# Patient Record
Sex: FEMALE | Race: WHITE | Employment: PART TIME | URBAN - METROPOLITAN AREA
[De-identification: names, ages, dates, MRNs, and addresses within clinical notes are randomized per-mention and may not be internally consistent; named-entity substitution may affect disease eponyms.]

---
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
## 2019-09-11 ENCOUNTER — OFFICE VISIT (OUTPATIENT)
Dept: PODIATRY | Age: 19
End: 2019-09-11
Payer: COMMERCIAL

## 2019-09-11 VITALS — WEIGHT: 165 LBS | BODY MASS INDEX: 30.36 KG/M2 | HEIGHT: 62 IN

## 2019-09-11 DIAGNOSIS — M79.671 FOOT PAIN, RIGHT: ICD-10-CM

## 2019-09-11 DIAGNOSIS — M79.672 FOOT PAIN, LEFT: Primary | ICD-10-CM

## 2019-09-11 DIAGNOSIS — M72.2 PLANTAR FASCIITIS, BILATERAL: ICD-10-CM

## 2019-09-11 PROCEDURE — 1036F TOBACCO NON-USER: CPT | Performed by: PODIATRIST

## 2019-09-11 PROCEDURE — 99203 OFFICE O/P NEW LOW 30 MIN: CPT | Performed by: PODIATRIST

## 2019-09-11 PROCEDURE — G8419 CALC BMI OUT NRM PARAM NOF/U: HCPCS | Performed by: PODIATRIST

## 2019-09-11 PROCEDURE — G8427 DOCREV CUR MEDS BY ELIG CLIN: HCPCS | Performed by: PODIATRIST

## 2019-09-11 RX ORDER — PSEUDOEPHEDRINE HCL 120 MG/1
TABLET, FILM COATED, EXTENDED RELEASE ORAL
COMMUNITY

## 2019-09-11 RX ORDER — OLOPATADINE HYDROCHLORIDE 2 MG/ML
SOLUTION/ DROPS OPHTHALMIC
COMMUNITY
Start: 2019-08-13

## 2019-09-11 RX ORDER — LORATADINE 10 MG/1
TABLET ORAL
COMMUNITY
Start: 2017-06-13

## 2019-09-11 RX ORDER — OLOPATADINE HYDROCHLORIDE 2 MG/ML
SOLUTION/ DROPS OPHTHALMIC
COMMUNITY
End: 2019-09-11 | Stop reason: SDUPTHER

## 2019-09-11 RX ORDER — SODIUM FLUORIDE 5 MG/ML
PASTE, DENTIFRICE DENTAL
COMMUNITY

## 2019-09-11 ASSESSMENT — ENCOUNTER SYMPTOMS
COLOR CHANGE: 0
VOMITING: 0
DIARRHEA: 0
NAUSEA: 0
CONSTIPATION: 0

## 2019-09-11 NOTE — PROGRESS NOTES
1 tablet by mouth 2 times daily Taking as directed x 2 weeks for sinusitis   Yes Historical Provider, MD   fluticasone (FLONASE) 50 MCG/ACT nasal spray 1 spray by Nasal route daily   Yes Historical Provider, MD   Probiotic Product (PROBIOTIC-10 PO) Take 1 tablet by mouth daily   Yes Historical Provider, MD       Past Surgical History:   Procedure Laterality Date    SINUS SURGERY         No family history on file. Social History     Tobacco Use    Smoking status: Never Smoker    Smokeless tobacco: Never Used   Substance Use Topics    Alcohol use: No       Review of Systems   Constitutional: Negative for chills, diaphoresis, fatigue, fever and unexpected weight change. Cardiovascular: Negative for leg swelling. Gastrointestinal: Negative for constipation, diarrhea, nausea and vomiting. Musculoskeletal: Positive for gait problem. Negative for arthralgias and joint swelling. Skin: Negative for color change, pallor, rash and wound. Neurological: Negative for weakness and numbness. Lower Extremity Physical Examination:     Vitals: There were no vitals filed for this visit. General: AAO x 3 in NAD. Musculoskeletal:Pain present upon palpation of central arches and medial calcaneal  tubercle, Bilateral.  no pain with squeeze of the heel. normal medial longitudinal arch, Bilateral.  Ankle ROM normal,Bilateral.      Vascular: DP and PT pulses palpable 2/4, Bilateral.  CFT <3 seconds, Bilateral.  Hair growth present to the level of the digits, Bilateral.  Edema absent, Bilateral.  Varicosities absent, Bilateral. Erythema absent, Bilateral    Neurological: Sensation intact to light touch to level of digits, Bilateral.      Integument: Warm, dry, supple, Bilateral.  Open lesion absent, Bilateral.     Radiographs: 3 views right Foot:  3 views foot weightbearing  : no soft tissue deficits, no soft tissue emphysema, no masses, no cortical interruptions, all joints appear well-aligned. Radiographs: 3 views left Foot:  3 views foot weightbearing  : no soft tissue deficits, no soft tissue emphysema, no masses, no cortical interruptions, all joints appear well-aligned. Asessment: Patient is a 22 y.o. female with:  1. Foot pain, left    2. Foot pain, right    3. Plantar fasciitis, bilateral        Plan: Patient examined and evaluated. Current condition and treatment options discussed in detail. Verbal and written instructions given to patient. Patient was casted for custom molded orthotics today. I feel that these appliances are medically necessary for my patients well being and in my opinion are both reasonable and necessary to the accepted medical practice in the treatment of the above conditions. Custom molded orthotics prevent the over pronation which is leading to excessive tension of the plantar fascia. Abnormal foot/leg functions demands mechanical, functional protections and control. Without custom molded orthotics, the patient may develop chronic pain in her feet. Later on in life, the patient may develop ankle, shin, knee and hip pain as well. In trial usage of felt pads with Lo-Dye ankle strapping to mimic the effects of the orthotics, the patient responded with reduced symptoms and better foot function. Description of the devices: These devices are Root biomechanical orthotic devices made of a plastic polymer with a leather or Spenco-type top cover. These appliances control biomechanical aberrations of the patients feet and accommodate painful areas. Orthotics are not intended to be worn in lieu of shoes, but are removable devices formed from casts of the patients feet and then sent to an independent laboratory for fabrication. Due to the nature of my patients condition, I feel that this therapy is necessary indefinitely, as this is a form of continuous therapy. This is NOT a convenience item.   It is my opinion that these devices will prevent the need

## 2019-10-08 ENCOUNTER — TELEPHONE (OUTPATIENT)
Dept: PODIATRY | Age: 19
End: 2019-10-08

## 2019-10-11 ENCOUNTER — NURSE ONLY (OUTPATIENT)
Dept: PODIATRY | Age: 19
End: 2019-10-11

## 2020-01-20 NOTE — ED NOTES
Pt c/o cough with yellow/ clear prod cough . Pt is currently treating for sinusitis with atb . Pt is here because she had to call off work today due to not feeling well .       Mitali Billings RN  10/15/17 [id number]
58F with pmh asthma presenting with 2 weeks of dry cough, rhinorrhea, intermittent chest pain worse when coughing, subjective fever. Sick contact with family with flu.  also in ED with similar symptoms. Recent cruise from [place] to [place]. Seen by pcp given penicillin, phenergan, prednisone prescription with no significant relief. Also endorses urinary frequency/incontinence which she states she gets when she gets sick. Denies vomiting, diarrhea, rash

## 2023-09-08 ENCOUNTER — OFFICE VISIT (OUTPATIENT)
Dept: PRIMARY CARE CLINIC | Age: 23
End: 2023-09-08

## 2023-09-08 VITALS
HEIGHT: 63 IN | WEIGHT: 155.8 LBS | BODY MASS INDEX: 27.61 KG/M2 | HEART RATE: 80 BPM | SYSTOLIC BLOOD PRESSURE: 138 MMHG | DIASTOLIC BLOOD PRESSURE: 78 MMHG | OXYGEN SATURATION: 100 %

## 2023-09-08 DIAGNOSIS — G62.9 NEUROPATHY: ICD-10-CM

## 2023-09-08 DIAGNOSIS — G90.513 COMPLEX REGIONAL PAIN SYNDROME TYPE 1 OF BOTH UPPER EXTREMITIES: Primary | ICD-10-CM

## 2023-09-08 DIAGNOSIS — Z00.00 HEALTHCARE MAINTENANCE: ICD-10-CM

## 2023-09-08 DIAGNOSIS — H69.83 CHRONIC DYSFUNCTION OF BOTH EUSTACHIAN TUBES: ICD-10-CM

## 2023-09-08 PROBLEM — J32.9 CHRONIC SINUSITIS: Status: ACTIVE | Noted: 2018-11-13

## 2023-09-08 PROBLEM — D69.1 PLATELET FUNCTION DEFECT (HCC): Status: ACTIVE | Noted: 2019-02-22

## 2023-09-08 PROBLEM — M72.2 PLANTAR FASCIITIS: Status: ACTIVE | Noted: 2018-11-13

## 2023-09-08 RX ORDER — GABAPENTIN 100 MG/1
100 CAPSULE ORAL 3 TIMES DAILY
COMMUNITY

## 2023-09-08 SDOH — ECONOMIC STABILITY: INCOME INSECURITY: HOW HARD IS IT FOR YOU TO PAY FOR THE VERY BASICS LIKE FOOD, HOUSING, MEDICAL CARE, AND HEATING?: SOMEWHAT HARD

## 2023-09-08 SDOH — ECONOMIC STABILITY: HOUSING INSECURITY
IN THE LAST 12 MONTHS, WAS THERE A TIME WHEN YOU DID NOT HAVE A STEADY PLACE TO SLEEP OR SLEPT IN A SHELTER (INCLUDING NOW)?: NO

## 2023-09-08 SDOH — ECONOMIC STABILITY: FOOD INSECURITY: WITHIN THE PAST 12 MONTHS, YOU WORRIED THAT YOUR FOOD WOULD RUN OUT BEFORE YOU GOT MONEY TO BUY MORE.: NEVER TRUE

## 2023-09-08 SDOH — ECONOMIC STABILITY: FOOD INSECURITY: WITHIN THE PAST 12 MONTHS, THE FOOD YOU BOUGHT JUST DIDN'T LAST AND YOU DIDN'T HAVE MONEY TO GET MORE.: NEVER TRUE

## 2023-09-08 ASSESSMENT — PATIENT HEALTH QUESTIONNAIRE - PHQ9
SUM OF ALL RESPONSES TO PHQ9 QUESTIONS 1 & 2: 0
2. FEELING DOWN, DEPRESSED OR HOPELESS: 0
SUM OF ALL RESPONSES TO PHQ QUESTIONS 1-9: 0
1. LITTLE INTEREST OR PLEASURE IN DOING THINGS: 0

## 2023-09-08 ASSESSMENT — ENCOUNTER SYMPTOMS
ABDOMINAL PAIN: 0
RHINORRHEA: 0
CONSTIPATION: 0
ABDOMINAL DISTENTION: 0
SINUS PRESSURE: 0
VOMITING: 0
SORE THROAT: 0
CHEST TIGHTNESS: 0
COUGH: 0
DIARRHEA: 0
SHORTNESS OF BREATH: 0

## 2023-09-08 NOTE — PROGRESS NOTES
External Referral To Pain Clinic  -     FIDELINA Screen With Reflex; Future  -     Vitamin D 25 Hydroxy; Future  -     Lipid, Fasting; Future  -     TSH With Reflex Ft4; Future  2. Healthcare maintenance  -     HIV Screen; Future  -     Hepatitis C Antibody; Future  -     CBC with Auto Differential; Future  -     Comprehensive Metabolic Panel; Future  3. Neuropathy  -     Vitamin D 25 Hydroxy; Future  -     Lipid, Fasting; Future  -     TSH With Reflex Ft4; Future  -     Vitamin B12; Future  -     C-Reactive Protein; Future  -     Sedimentation Rate; Future  4. Chronic dysfunction of both eustachian tubes  -     [facility] - Pratik Kumar MD, Otolaryngology, [place]           Return for Follow up if symptoms persist or worsen. Patient given educational materials - see patient instructions. Discussed use, benefit, and side effects of prescribed medications. All patient questions answered. Pt voiced understanding. Reviewed health maintenance. Instructed to continue current medications, diet and exercise. Patient agreed with treatment plan. Follow up as directed.      Electronically signed by Lura Mcardle, PA on 9/8/2023 at 12:13 PM

## 2023-09-11 DIAGNOSIS — G62.9 NEUROPATHY: ICD-10-CM

## 2023-09-11 DIAGNOSIS — Z00.00 HEALTHCARE MAINTENANCE: ICD-10-CM

## 2023-09-11 DIAGNOSIS — G90.513 COMPLEX REGIONAL PAIN SYNDROME TYPE 1 OF BOTH UPPER EXTREMITIES: ICD-10-CM

## 2023-09-12 LAB
ABSOLUTE IMMATURE GRANULOCYTE: <0.03 K/UL (ref 0–0.3)
ALBUMIN SERPL-MCNC: 4.7 G/DL (ref 3.5–5.2)
ALBUMIN/GLOBULIN RATIO: 2.2 (ref 1–2.5)
ALP BLD-CCNC: 48 U/L (ref 35–104)
ALT SERPL-CCNC: 14 U/L (ref 5–33)
ANION GAP SERPL CALCULATED.3IONS-SCNC: 11 MMOL/L (ref 9–17)
AST SERPL-CCNC: 13 U/L
BASOPHILS ABSOLUTE: 0.03 K/UL (ref 0–0.2)
BASOPHILS RELATIVE PERCENT: 1 % (ref 0–2)
BILIRUB SERPL-MCNC: 0.9 MG/DL (ref 0.3–1.2)
BUN BLDV-MCNC: 11 MG/DL (ref 6–20)
C-REACTIVE PROTEIN: <3 MG/L (ref 0–5)
CALCIUM SERPL-MCNC: 9.2 MG/DL (ref 8.6–10.4)
CHLORIDE BLD-SCNC: 105 MMOL/L (ref 98–107)
CHOLESTEROL, FASTING: 145 MG/DL
CHOLESTEROL/HDL RATIO: 2.6
CO2: 24 MMOL/L (ref 20–31)
CREAT SERPL-MCNC: 0.6 MG/DL (ref 0.5–0.9)
EOSINOPHILS ABSOLUTE: 0.09 K/UL (ref 0–0.44)
EOSINOPHILS RELATIVE PERCENT: 2 % (ref 1–4)
GFR SERPL CREATININE-BSD FRML MDRD: >60 ML/MIN/1.73M2
GLUCOSE BLD-MCNC: 94 MG/DL (ref 70–99)
HCT VFR BLD CALC: 40.8 % (ref 36.3–47.1)
HDLC SERPL-MCNC: 56 MG/DL
HEMOGLOBIN: 13.3 G/DL (ref 11.9–15.1)
HEPATITIS C ANTIBODY: NONREACTIVE
HIV AG/AB: NONREACTIVE
IMMATURE GRANULOCYTES: 0 %
LDL CHOLESTEROL: 81 MG/DL (ref 0–130)
LYMPHOCYTES ABSOLUTE: 1.29 K/UL (ref 1.1–3.7)
LYMPHOCYTES RELATIVE PERCENT: 29 % (ref 24–43)
MCH RBC QN AUTO: 31.7 PG (ref 25.2–33.5)
MCHC RBC AUTO-ENTMCNC: 32.6 G/DL (ref 28.4–34.8)
MCV RBC AUTO: 97.4 FL (ref 82.6–102.9)
MONOCYTES ABSOLUTE: 0.45 K/UL (ref 0.1–1.2)
MONOCYTES RELATIVE PERCENT: 10 % (ref 3–12)
NEUTROPHILS ABSOLUTE: 2.61 K/UL (ref 1.5–8.1)
NEUTROPHILS RELATIVE PERCENT: 58 % (ref 36–65)
NRBC AUTOMATED: 0 PER 100 WBC
PDW BLD-RTO: 11.7 % (ref 11.8–14.4)
PLATELET # BLD: 247 K/UL (ref 138–453)
PMV BLD AUTO: 11.5 FL (ref 8.1–13.5)
POTASSIUM SERPL-SCNC: 4.4 MMOL/L (ref 3.7–5.3)
RBC # BLD: 4.19 M/UL (ref 3.95–5.11)
SEDIMENTATION RATE, ERYTHROCYTE: 1 MM/HR (ref 0–20)
SODIUM BLD-SCNC: 140 MMOL/L (ref 135–144)
TOTAL PROTEIN: 6.8 G/DL (ref 6.4–8.3)
TRIGLYCERIDE, FASTING: 40 MG/DL
TSH SERPL DL<=0.05 MIU/L-ACNC: 1.73 UIU/ML (ref 0.3–5)
VITAMIN B-12: 474 PG/ML (ref 232–1245)
VITAMIN D 25-HYDROXY: 42.8 NG/ML
WBC # BLD: 4.5 K/UL (ref 3.5–11.3)

## 2023-09-14 LAB
ANTI DNA DOUBLE STRANDED: <0.5 IU/ML
ANTI-NUCLEAR ANTIBODY (ANA): NEGATIVE
ENA ANTIBODIES SCREEN: 0.2 U/ML

## 2023-09-19 ENCOUNTER — OFFICE VISIT (OUTPATIENT)
Age: 23
End: 2023-09-19

## 2023-09-19 VITALS — HEIGHT: 63 IN | WEIGHT: 155 LBS | BODY MASS INDEX: 27.46 KG/M2

## 2023-09-19 DIAGNOSIS — M25.531 PAIN IN BOTH WRISTS: Primary | ICD-10-CM

## 2023-09-19 DIAGNOSIS — G90.50 COMPLEX REGIONAL PAIN SYNDROME TYPE 1, AFFECTING UNSPECIFIED SITE: ICD-10-CM

## 2023-09-19 DIAGNOSIS — M25.532 PAIN IN BOTH WRISTS: Primary | ICD-10-CM

## 2023-09-19 NOTE — PROGRESS NOTES
[place]  [facility]  [address] [place] [address] [address]  [place] [address]  Dept: [phone]  Dept Fax: [phone]    Chief Compliant:  Chief Complaint   Patient presents with    Wrist Pain     Aleksandar Fernandez is here today for right wrist pain and elbow pain. She states this all started with a sprained wrist back in 2022. History of Present Illness: This is a pleasant 20 y.o. female who is here today for evaluation of multiple symptoms. She used to live in [place].  She states that she has had 2 different wrist sprains on the right wrist while in [place].  She notes that she has had significant pain numbness and tingling in both the right and left arm. She notes pain in her neck that radiates into her face and pain in her thoracic and lumbar spine as well. She was seen by an orthopedic surgeon there. She states that she was prescribed occupational therapy and was originally diagnosed with frozen shoulder as well and eventually diagnosed with medial epicondylitis. She states that her shoulder motion has gotten better in occupational therapy. She takes gabapentin 100 mg 3 times daily. She saw a chiropractor in [place] who did a manipulation on her right wrist.  She eventually was referred to pain management for possible complex regional pain syndrome in [place] but then she moved back to [place] and has not seen pain management yet. She did recently establish with a primary care doctor here in [facility] and they do have her scheduled for a consultation with pain management. She states that she also had an EMG of both arms in April 2023 and she was told that it showed some mild left carpal tunnel syndrome. Physical Exam: The right shoulder has 90 degrees of active forward elevation. The right elbow has motion from near full extension to 90 degrees of flexion.   The left shoulder has

## 2023-09-25 ENCOUNTER — HOSPITAL ENCOUNTER (OUTPATIENT)
Dept: OCCUPATIONAL THERAPY | Age: 23
Setting detail: THERAPIES SERIES
Discharge: HOME | End: 2023-09-25
Payer: MEDICAID

## 2023-09-25 PROCEDURE — 97166 OT EVAL MOD COMPLEX 45 MIN: CPT

## 2023-09-25 PROCEDURE — 97110 THERAPEUTIC EXERCISES: CPT

## 2023-09-25 ASSESSMENT — 9 HOLE PEG TEST
TEST_RESULT: IMPAIRED
TEST_RESULT: IMPAIRED
TESTTIME_SECONDS: 46
TESTTIME_SECONDS: 43

## 2023-09-25 ASSESSMENT — PAIN SCALES - GENERAL: PAINLEVEL_OUTOF10: 7

## 2023-09-25 ASSESSMENT — PAIN DESCRIPTION - ORIENTATION: ORIENTATION: LEFT;RIGHT

## 2023-09-25 ASSESSMENT — PAIN DESCRIPTION - PAIN TYPE: TYPE: ACUTE PAIN

## 2023-09-25 NOTE — PROGRESS NOTES
pinch (lateral, bauer, tip) by 4-5# so that pt will be able to open jars/bottles easier. Long Term Goal 8 Compared to eval:Pt will report improved bilateral hand coordination for fine motor ADLs (button manipulation,cutting food). Pt will complete 9 hole peg test 20 seconds quicker than eval with rt hand/lt hand. Long Term Goal 9 Increase lt UE strength: shoulder (flexion,abdcutor) to 3+/5, shoulder (IR,ER) to 4/5,elbow (flexor,extensor) to 4/5, forearm(supinator,pronator) to 4/5, wrist(flexor,extensor) to 4/5 so that pt can put more wt on her arm when getting out of chair, perform light housework, & light lift/carry. Long Term Goal 10 Increase rt UE strength:shoulder (flexion,abdcutor) to 3+/5, shoulder (IR,ER) to 4/5,elbow (flexor,extensor) to 4/5, forearm(supinator,pronator) to 4/5, wrist(flexor,extensor) to 4/5 so that pt can put more wt on her arm when getting out of chair, perform light housework, & light lift/carry. TREATMENT PLAN       REQUIRES OT FOLLOW-UP: Yes  Type: Outpatient  Treatment Initiated : See OT exercises for details. Additional Comments: continue OT    Pt. and/or family actively involved in establishing Plan of Care and Goals: Yes   Patient/ Caregiver education and instruction: OT Role, Plan of Care, Goal setting Patient Education: Pt brought in her current bilateral UE HEP  which is extensive (bilateral UE/hand & scapula rom, & neck AROM, pendulum exercises, UE cane exercises, desensitization rubbing different textures on her arms, coordination activity moving beads on a string, UE nerve glides) that was provided to her when she received OT in [place] earlier this year  (May-July 2023). Pt states with her current HEP it hurts too bad to do UE cane exercises for shoulder abduction and raising cane over head & back. Specific Instructions for Next Treatment: Begin UE wt bearing/stress loading exercises, AROM ,coordination exercises.       Treatment may include any combination of

## 2023-09-28 ENCOUNTER — HOSPITAL ENCOUNTER (OUTPATIENT)
Dept: OCCUPATIONAL THERAPY | Age: 23
Setting detail: THERAPIES SERIES
Discharge: HOME | End: 2023-09-28
Payer: MEDICAID

## 2023-09-28 PROCEDURE — 97140 MANUAL THERAPY 1/> REGIONS: CPT

## 2023-09-28 PROCEDURE — 97110 THERAPEUTIC EXERCISES: CPT

## 2023-09-28 ASSESSMENT — PAIN DESCRIPTION - LOCATION: LOCATION: ARM;BACK;LEG;NECK

## 2023-09-28 ASSESSMENT — PAIN SCALES - GENERAL: PAINLEVEL_OUTOF10: 6

## 2023-09-28 ASSESSMENT — PAIN DESCRIPTION - ORIENTATION: ORIENTATION: RIGHT;LEFT

## 2023-09-28 ASSESSMENT — PAIN DESCRIPTION - PAIN TYPE: TYPE: ACUTE PAIN

## 2023-09-28 NOTE — PROGRESS NOTES
started in [place]. Pt reports she has  female siblings with CRPS. Pt reports difficulty holding onto phone. OT suggested that pt put call on speaker phone then she doesn't have to hold phone. Pt told OT that sometimes you have to hold the phone d/t nature of the call. Pt reports she has decrease sleep. HEP Compliance: Good        OBJECTIVE EXAMINATION   Restrictions: Restrictions/Precautions: None     TREATMENT     Exercises:     Treatment Reasoning    OT Exercise 1: PROM bilateral UE & hand , AAROM scapula protraction/retraction with extend/flex elbow when reaching toward ceiling with OT providing support to pt's arm 2 sets 5 rt/lt UE,  (pt supine on therapy mat)  OT Exercise 2: yellow therapy ball bilateral UE 10x each way (press into sides of ball,press top of ball), scapula pro/retraction with elbow flex/extension reaching forward/back 6x, UE horizontal abduction/adduction 6x, shoulder flex/extension 6x)- pt sitting at edge of therapy mat)  OT Exercise 3: finger ladder forward reach: rt UE up/down 2 sets ht 7 , lt UE up/evert 2 sets ht 8  OT Exercise 4: yellow 1.5# digiflex bilateral hand gripping 2 setx 10x  OT Exercise 5: hand based skateboard 5x each way (circles cw & ccw, reach foward /back) - rt UE, lt UE    Limitations addressed: Strength, Coordination, Flexibility (rom)  Limitations addressed: Bilateral UE weakness, stiffness, impaired coordination, pain  Therapist provided: Verbal cuing  Progressed: Complexity of movement  Progressed: Begun therapy ball exercises for rom & wt bearing,digiflex for bilateral hand strengthening , finger ladder for rom & coordination,& hand based skateboard for UE rom. Patient Education:   Pacing with exercises,stopping when pain increases & take a brief break. ASSESSMENT     Assessment: Assessment: OT completed bilateral UE PROM & AAROM with pt supine on therapy mat. Pt reports pain with PROM bilateral shoulders.  Pt continues with bilateral UE

## 2023-10-02 ENCOUNTER — HOSPITAL ENCOUNTER (OUTPATIENT)
Dept: OCCUPATIONAL THERAPY | Age: 23
Setting detail: THERAPIES SERIES
Discharge: HOME | End: 2023-10-02
Payer: MEDICAID

## 2023-10-02 PROCEDURE — 97110 THERAPEUTIC EXERCISES: CPT

## 2023-10-02 PROCEDURE — 97140 MANUAL THERAPY 1/> REGIONS: CPT

## 2023-10-02 ASSESSMENT — PAIN DESCRIPTION - LOCATION: LOCATION: ARM;LEG;BACK

## 2023-10-02 ASSESSMENT — PAIN SCALES - GENERAL: PAINLEVEL_OUTOF10: 4

## 2023-10-02 ASSESSMENT — PAIN DESCRIPTION - ORIENTATION: ORIENTATION: RIGHT;LEFT

## 2023-10-02 ASSESSMENT — PAIN DESCRIPTION - PAIN TYPE: TYPE: ACUTE PAIN

## 2023-10-02 NOTE — PROGRESS NOTES
[address] [place]. [address]         Phone: [phone]       Fax: [phone]     Occupational Therapy Daily Treatment note     Occupational Therapy: Daily Note   Patient: Jun George (52 y.o. female)   Examination Date: 10/02/2023  No data recorded  No data recorded   :  2000 # of Visits since [facility]:   3   MRN: [id number]  CSN: [id number] Start of Care Date: 2023   Insurance: Payor: [employer] [place] / Plan[place] / Product Type: *No Product type* /   Insurance ID: [id number] - (Medicaid Managed) Secondary Insurance (if applicable): Insurance Information: [employer] Medicaid  (30 visits OT then pt will need authorization for futher visits. Referring Physician: MD Dr Christine Drummond MD   PCP: ATA Alejandra Visits to Date/Visits Approved: 3 / 20    No Show/Cancelled Appts:   /       Medical Diagnosis: Pain in both wrists [M25.531, M25.532]  Complex regional pain syndrome type 1, affecting unspecified site [G90.50] pain in both wrists (M25.531, M25.532) ICD-10 code, complex regional pain syndrome type 1 affection unspecified site (G90.50) ICD-10 code  Treatment Diagnosis: bilateral UE & hand weakness,impaired coordination, impaired sensation, pain, stiffness( bilateral shoulders,elbow flexion,supination,wrist UD) & rt wrist extension (ICD-10 codes: M62.81, R27.9, R20.2, M79.621, M79.622, M79.631, M79.632, M79.641, M79.642)        SUBJECTIVE EXAMINATION   Pain Level: Pain Screening  Patient Currently in Pain: Yes  Pain Assessment: 0-10  Pain Level: 4  Post Treatment Pain Level: 5  Pain Type: Acute pain  Pain Location: Arm, Leg, Back  Pain Orientation: Right, Left  Pain Descriptors: Tightness, Tingling, Dull, Spasm, Sharp    Patient Comments: Subjective: Pt states she is reaching higher since last therapy. Pt states she was sore the day after therapy.  Pt states

## 2023-10-04 ENCOUNTER — HOSPITAL ENCOUNTER (OUTPATIENT)
Dept: OCCUPATIONAL THERAPY | Age: 23
Setting detail: THERAPIES SERIES
Discharge: HOME | End: 2023-10-04
Payer: MEDICAID

## 2023-10-04 PROCEDURE — 97110 THERAPEUTIC EXERCISES: CPT

## 2023-10-04 PROCEDURE — 97140 MANUAL THERAPY 1/> REGIONS: CPT

## 2023-10-04 NOTE — FLOWSHEET NOTE
[address]  [address] [facility].     [phone]  F: [phone]   [] [facility]  [address]   [address]  P: [phone]  F: [phone] [] Rory Self  P: [phone]  F: [phone]  [x] [facility]  [address]  [place]: [phone]   F: [phone]     Occupational Therapy Daily Treatment Note    Date:  10/4/2023  Patient Name:  Jimmy Greene    :  2000  MRN: [id number]  Physician: Radha Major MD     Insurance: Polina Bom[place]in [place]  Diagnosis:  Pain in both wrists [M25.531, M25.532]  Complex regional pain syndrome type 1, affecting unspecified site [G90.50] pain in both wrists (M25.531, M25.532) ICD-10 code, complex regional pain syndrome type 1 affection unspecified site (G90.50) ICD-10 code  Onset Date: 22   Visit# / total visits: ; Progress note for Medicare patient due at visit 10    Cancels/No Shows: 0      Subjective:    Pain:  [x] Yes  [] No Location: pain and tingling in arms Pain Rating: (0-10 scale) 4/10  Pain altered Tx:  [x] No  [] Yes  Action:  Pt Comments: Rolling hands flex bar flared up tingling and nerve pain    Objective:  Modalities:  Precautions:  Exercises:  EXERCISE    REPS/     TIME  WEIGHT/    LEVEL COMMENTS    PROM    bilateral UE & hand , AAROM scapula protraction/retraction with extend/flex elbow when reaching toward ceiling with OT providing support to pt's arm 2 sets 10x rt/lt UE, arm cirles cw & ccw 10x each way  (rt/lt  shoulder flex to 90 (pt supine on therapy mat),scapula mobilization with pt sitting at edge of therapy mat    yellow therapy ball    bilateral UE 2 sets  10x each way (press into sides of ball,press top of ball), scapula pro/retraction with elbow flex/extension reaching forward/back  2 sets

## 2023-10-09 ENCOUNTER — HOSPITAL ENCOUNTER (OUTPATIENT)
Dept: OCCUPATIONAL THERAPY | Age: 23
Setting detail: THERAPIES SERIES
Discharge: HOME | End: 2023-10-09
Payer: MEDICAID

## 2023-10-09 PROCEDURE — 97110 THERAPEUTIC EXERCISES: CPT

## 2023-10-09 PROCEDURE — 97535 SELF CARE MNGMENT TRAINING: CPT

## 2023-10-09 ASSESSMENT — PAIN SCALES - GENERAL: PAINLEVEL_OUTOF10: 7

## 2023-10-09 ASSESSMENT — PAIN DESCRIPTION - ORIENTATION: ORIENTATION: RIGHT;LEFT

## 2023-10-09 ASSESSMENT — PAIN DESCRIPTION - PAIN TYPE: TYPE: CHRONIC PAIN;ACUTE PAIN

## 2023-10-09 ASSESSMENT — PAIN DESCRIPTION - LOCATION: LOCATION: GENERALIZED

## 2023-10-09 NOTE — PROGRESS NOTES
[address] [place]         Phone: [phone]       Fax: [phone]     Occupational Therapy Daily Treatment note     Occupational Therapy: Daily Note   Patient: Debbie Tena (40 y.o. female)   Examination Date: 10/09/2023  No data recorded  No data recorded   :  2000 # of Visits since [facility]:   5   MRN: [id number]  CSN: [id number] Start of Care Date: 2023   Insurance: Pay[place] / Plan: [place] / Product Type: *No Product type* /   Insurance ID: [id number] - (Medicaid Managed) Secondary Insurance (if applicable):     Insurance Information:     Referring Physician: Morena Perales MD     PCP: ATA Valdez Visits to Date/Visits Approved:     No Show/Cancelled Appts:   /       Medical Diagnosis: Pain in both wrists [M25.531, M25.532]  Complex regional pain syndrome type 1, affecting unspecified site [G90.50]    Treatment Diagnosis: bilateral UE & hand weakness,impaired coordination, impaired sensation, pain, stiffness( bilateral shoulders,elbow flexion,supination,wrist UD) & rt wrist extension (ICD-10 codes: M62.81, R27.9, R20.2, M79.621, M79.622, M79.631, M79.632, M79.641, M79.642)        SUBJECTIVE EXAMINATION   Pain Level: Pain Screening  Patient Currently in Pain: Yes  Pain Assessment: 0-10  Pain Level: 7  Post Treatment Pain Level: 6  Pain Type: Chronic pain, Acute pain  Pain Location: Generalized (\"From my neck to my toes\")  Pain Orientation: Right, Left  Pain Descriptors: Tightness, Tingling, Sharp, Spasm    Patient Comments: Subjective: \"Not that great today, it's a seven all around\" (referring to high levels of pain - states from her \"neck down to my toes\")    HEP Compliance: Good  Any changes to allergies, medications, or have you had any medical procedures/ER visits since your last visit?: No     OBJECTIVE EXAMINATION   Restrictions:

## 2023-10-13 ENCOUNTER — APPOINTMENT (OUTPATIENT)
Dept: OCCUPATIONAL THERAPY | Age: 23
End: 2023-10-13
Payer: MEDICAID

## 2023-10-16 ENCOUNTER — APPOINTMENT (OUTPATIENT)
Dept: OCCUPATIONAL THERAPY | Age: 23
End: 2023-10-16
Payer: MEDICAID

## 2023-10-17 ENCOUNTER — HOSPITAL ENCOUNTER (OUTPATIENT)
Dept: OCCUPATIONAL THERAPY | Age: 23
Setting detail: THERAPIES SERIES
Discharge: HOME | End: 2023-10-17
Payer: MEDICAID

## 2023-10-17 PROCEDURE — 97110 THERAPEUTIC EXERCISES: CPT

## 2023-10-17 NOTE — FLOWSHEET NOTE
activities     Short Term Goals Completed by 10 visits Current Status Goal Status   Pt will demo & report modified independence with bilateral UE HEP. 2. Pt will report decrease in bilateral UE pain to 5 when she completes her daily activities and exercises. 3. Decrease muscle tightness & Increase PROM bilateral UE shoulder (flexion, abduction) by 10, lt forearm supination by 10, & lt wrist extension by 10     4. Increase AROM rt UE by 10 (shoulder flexion & abduction, elbow flexion, forearmsupination/pronation, wrist flexion/extension) & by 5 for wrist UD to improve functional motion for getting dressed & reaching into cabinets. 5. Increase AROM lt UE by 10 for shoulder flexion & abduction, forearm supinaiton/pronation, & by 5 for wrist UD to improve functional motion for getting dressed & reaching into cabinets       6. Increase rt hand strength:  by 10# & pinch (lateral , bauer, tip) by 2# so that pt will be able to open jars/bottles easier. 7. Increase lt hand strength:  by 10# & pinch (lateral, bauer, tip) by 2# so that pt will be able to open jars/bottles easier. 8. Pt will report improved bilateral hand coordination for fine motor ADLs (button manipulation,cutting food). Pt will complete 9 hole peg test 10 seconds quicker than eval with rt hand/lt hand. 9. Pt will report increase UEFI total score by 10 points & report that doing ADLS & light IADLs are a little easier to do. Long Term Goals Completed by 20 visits Current Status Goal Status   Compared to eval:Pt will report increase UEFI total score by 15-20 points & report that doing ADLS & light IADLs are a little easier to do. 2. Pt will report decrease in bilateral UE pain to 3 when she completes her daily activities and exercises.      3. Compared to eval :Decrease muscle tightness &

## 2023-10-19 ENCOUNTER — HOSPITAL ENCOUNTER (OUTPATIENT)
Dept: OCCUPATIONAL THERAPY | Age: 23
Setting detail: THERAPIES SERIES
Discharge: HOME | End: 2023-10-19
Payer: MEDICAID

## 2023-10-19 PROCEDURE — 97110 THERAPEUTIC EXERCISES: CPT

## 2023-10-19 NOTE — FLOWSHEET NOTE
[address]  [address] [facility].     [phone]  F: [phone]   [] [address] [place] [address]  [address]   [address]  P: [phone]  F: [phone] [] Rory Self  P: [phone]  F: [phone]  [x] [facility]  [address]  [place]: [phone]   F: [phone]     Occupational Therapy Daily Treatment Note    Date:  10/19/2023  Patient Name:  Luis Larson    :  2000  MRN: [id number]  Physician: Elissa Ramsey MD     Insurance: Surjit Monday [place]  Diagnosis:  Pain in both wrists [M25.531, M25.532]  Complex regional pain syndrome type 1, affecting unspecified site [G90.50] pain in both wrists (M25.531, M25.532) ICD-10 code, complex regional pain syndrome type 1 affection unspecified site (G90.50) ICD-10 code  Onset Date: 22   Visit# / total visits: ; Progress note for Medicare patient due at visit 10    Cancels/No Shows: 0      Subjective:    Pain:  [x] Yes  [] No Location: neck and jaw Pain Rating: (0-10 scale) 5/10  Pain altered Tx:  [x] No  [] Yes  Action:  Pt Comments: states a little pain after last session however felt better next day with movements    Objective:  Modalities:  Precautions:  Exercises:  EXERCISE    REPS/     TIME  WEIGHT/    LEVEL COMMENTS    PROM    bilateral UE & hand , AAROM scapula protraction/retraction with extend/flex elbow when reaching toward ceiling with OT providing support to pt's arm 2 sets 10x rt/lt UE, arm cirles cw & ccw 10x each way  (rt/lt  shoulder flex to 90 (pt supine on therapy mat),scapula mobilization with pt sitting at edge of therapy mat    yellow therapy ball    bilateral UE 2 sets  10x each way (press into sides of ball,press top of ball), scapula pro/retraction with elbow flex/extension reaching

## 2023-10-23 ENCOUNTER — APPOINTMENT (OUTPATIENT)
Dept: OCCUPATIONAL THERAPY | Age: 23
End: 2023-10-23
Payer: MEDICAID

## 2023-10-24 ENCOUNTER — HOSPITAL ENCOUNTER (OUTPATIENT)
Dept: OCCUPATIONAL THERAPY | Age: 23
Setting detail: THERAPIES SERIES
Discharge: HOME | End: 2023-10-24
Payer: MEDICAID

## 2023-10-24 PROCEDURE — 97140 MANUAL THERAPY 1/> REGIONS: CPT

## 2023-10-24 PROCEDURE — 97110 THERAPEUTIC EXERCISES: CPT

## 2023-10-24 ASSESSMENT — PAIN SCALES - GENERAL: PAINLEVEL_OUTOF10: 5

## 2023-10-24 ASSESSMENT — PAIN DESCRIPTION - ORIENTATION: ORIENTATION: LEFT;RIGHT

## 2023-10-24 ASSESSMENT — PAIN DESCRIPTION - PAIN TYPE: TYPE: ACUTE PAIN

## 2023-10-24 ASSESSMENT — PAIN DESCRIPTION - LOCATION: LOCATION: ARM

## 2023-10-24 NOTE — PROGRESS NOTES
later they were doing ok. Pt states that her hands/feet are cold from raynauds. Pt states she is using the foam [facility] OT gave her and brushing her teeth is easier. Pt states that she saw pain clinic doctor & she  will go to [place] for PT using mirror therapy. Pt states she is doing cane exercise at home and family member helps to support her elbows. HEP Compliance: Good        OBJECTIVE EXAMINATION   Restrictions: Restrictions/Precautions: None        TREATMENT     Exercises:     Treatment Reasoning    OT Exercise 1: PROM bilateral UE & hand , AAROM scapula protraction/retraction with extend/flex elbow when reaching toward ceiling with OT providing support to pt's arm 2 sets 10x rt/lt UE, arm cirles cw & ccw 10x each way  (rt/lt  shoulder flex to 90 (pt supine on therapy mat),scapula mobilization with pt sitting at edge of therapy mat  OT Exercise 2: yellow therapy ball bilateral UE 2 sets  10x each way press into sides of ball, press top of ball,  2 sets 6x scapula pro/retraction with elbow flex/extension reaching forward/back, 2 sets 6x each way UE horizontal abduction/adduction, shoulder flex/extension- pt sitting at edge of therapy mat  OT Exercise 3: finger ladder forward reach: rt UE up/down 3 sets ht 20 , lt UE up/down 3 sets ht 20  OT Exercise 4: yellow 1.5# digiflex bilateral hand gripping 2 setx 10x  OT Exercise 5: hand based skateboard  7x each way (circles cw & ccw, reach foward /back) - rt UE, lt UE  OT Exercise 6: yellow theraband flex bar - using bilateral hands rolling bar forward/back on table  1 sets 7x  OT Exercise 8: Ambulation with 1#     Walk around therapy gym with 1# weight in each hand. Ramandeep 2 min    Limitations addressed: Strength, Coordination, Flexibility  Limitations addressed: Bilateral UE weakness, stiffness, impaired coordination, pain  Therapist provided: Verbal cuing  Progressed: Repetitions  Progressed: Increase reps & ht reaching for finger ladder. Increase reps hand based skateboard.

## 2023-10-27 ENCOUNTER — HOSPITAL ENCOUNTER (OUTPATIENT)
Dept: OCCUPATIONAL THERAPY | Age: 23
Setting detail: THERAPIES SERIES
Discharge: HOME | End: 2023-10-27
Payer: MEDICAID

## 2023-10-27 PROCEDURE — 97140 MANUAL THERAPY 1/> REGIONS: CPT

## 2023-10-27 PROCEDURE — 97110 THERAPEUTIC EXERCISES: CPT

## 2023-10-27 ASSESSMENT — PAIN DESCRIPTION - LOCATION: LOCATION: ARM

## 2023-10-27 ASSESSMENT — PAIN DESCRIPTION - ORIENTATION: ORIENTATION: RIGHT;LEFT

## 2023-10-27 ASSESSMENT — PAIN SCALES - GENERAL: PAINLEVEL_OUTOF10: 4

## 2023-10-27 ASSESSMENT — PAIN DESCRIPTION - PAIN TYPE: TYPE: ACUTE PAIN

## 2023-10-27 NOTE — PROGRESS NOTES
increased by 2 by end of tx. PROM bilateral UEs & hands, and scapula mobilization. Pt participates in bilateral UE & hand therapeutic exercises for stretching,wt bearing, reaching,coordination, & hand strengthening. Pt reports soreness with PROM bilateral shoulders. Pt demo progress with reaching rt UE on finger ladder . See OT exercises for details. Pt will benefit from continued skilled OT to provide manual therapy (63774 ) , therapeutic exercises (72819), therapeutic activities ( ) to facilitate bilateral UE rom, coordination, strength, desensitization, decrease pain,to improve pt's participation & independence for functional activities/light IADL. Performance deficits / Impairments: Decreased ROM, Decreased strength, Decreased high-level IADLs, Decreased sensation, Decreased coordination, Decreased fine motor control    Post-Treatment Pain Level: 6    Prognosis: Good    Activity Tolerance: Patient tolerated treatment well       Barriers impacting rehab : Pain tolerance/management     GOALS   Patient Goals   Patient goals : To decrease pain, increase bilateral motion & strength so she can do more of her daily activities    Short Term Goals Completed by 10 visits Current Status Goal Status   Pt will demo & report modified independence with bilateral UE HEP. 2. Pt will report decrease in bilateral UE pain to 5 when she completes her daily activities and exercises. 3. Decrease muscle tightness & Increase PROM bilateral UE shoulder (flexion, abduction) by 10, lt forearm supination by 10, & lt wrist extension by 10       4. Increase AROM rt UE by 10 (shoulder flexion & abduction, elbow flexion, forearmsupination/pronation, wrist flexion/extension) & by 5 for wrist UD to improve functional motion for getting dressed & reaching into cabinets.        5. Increase AROM lt UE by 10 for shoulder flexion & abduction, forearm supinaiton/pronation, & by 5 for wrist UD to improve functional motion for getting

## 2023-10-30 ENCOUNTER — HOSPITAL ENCOUNTER (OUTPATIENT)
Dept: OCCUPATIONAL THERAPY | Age: 23
Setting detail: THERAPIES SERIES
Discharge: HOME | End: 2023-10-30
Payer: MEDICAID

## 2023-10-30 PROCEDURE — 97110 THERAPEUTIC EXERCISES: CPT

## 2023-10-30 PROCEDURE — 97140 MANUAL THERAPY 1/> REGIONS: CPT

## 2023-10-30 ASSESSMENT — 9 HOLE PEG TEST
TESTTIME_SECONDS: 24
TEST_RESULT: IMPAIRED
TESTTIME_SECONDS: 31

## 2023-10-30 ASSESSMENT — PAIN DESCRIPTION - PAIN TYPE: TYPE: ACUTE PAIN

## 2023-10-30 ASSESSMENT — PAIN DESCRIPTION - LOCATION: LOCATION: ARM

## 2023-10-30 ASSESSMENT — PAIN DESCRIPTION - ORIENTATION: ORIENTATION: LEFT;RIGHT

## 2023-10-30 ASSESSMENT — PAIN SCALES - GENERAL: PAINLEVEL_OUTOF10: 4

## 2023-10-30 NOTE — PROGRESS NOTES
[address] [place]. [address]         Phone: [phone]       Fax: [phone]     Occupational Therapy  PROGRESS UPDATE /Daily Treatment note     Occupational Therapy PROGRESS UPDATE: Daily Note   Patient: Ivanna Flores (89 y.o. female)   Examination Date: 10/30/2023  No data recorded  No data recorded   :  2000 # of Visits since [facility]:   10   MRN: [id number]  CSN: [id number] Start of Care Date: 2023   Insurance: Payor: [employer] [place] / Plan: [employer] [place] / Product Type: *No Product type* /   Insurance ID: [id number] - (Medicaid Managed) Secondary Insurance (if applicable): Insurance Information: [employer]  (30 visits OT then pt will need authorization for futher visits). Referring Physician: MD Dr Balaji Oliver MD   PCP: ATA Short Visits to Date/Visits Approved: 10 / 20    No Show/Cancelled Appts:   /       Medical Diagnosis: Pain in both wrists [M25.531, M25.532]  Complex regional pain syndrome type 1, affecting unspecified site [G90.50] pain in both wrists (M25.531, M25.532) ICD-10 code, complex regional pain syndrome type 1 affection unspecified site (G90.50) ICD-10 code  Treatment Diagnosis: bilateral UE & hand weakness,impaired coordination, impaired sensation, pain, stiffness( bilateral shoulders,elbow flexion,supination,wrist UD) & rt wrist extension (ICD-10 codes: M62.81, R27.9, R20.2, M79.621, M79.622, M79.631, M79.632, M79.641, M79.642)        SUBJECTIVE EXAMINATION   Pain Level: Pain Screening  Patient Currently in Pain: Yes  Pain Assessment: 0-10  Pain Level: 4  Post Treatment Pain Level: 6  Pain Type: Acute pain  Pain Location: Arm  Pain Orientation: Left, Right  Pain Descriptors: Spasm, Aching, Dull, Sharp    Patient Comments: Subjective: Pt reports bilateral arm pain 4/10 but back pain is 7/10 .  Pt states she sat at a concert in a

## 2023-11-03 ENCOUNTER — HOSPITAL ENCOUNTER (OUTPATIENT)
Dept: OCCUPATIONAL THERAPY | Age: 23
Setting detail: THERAPIES SERIES
Discharge: HOME | End: 2023-11-03
Payer: MEDICAID

## 2023-11-03 PROCEDURE — 97140 MANUAL THERAPY 1/> REGIONS: CPT

## 2023-11-03 PROCEDURE — 97110 THERAPEUTIC EXERCISES: CPT

## 2023-11-03 ASSESSMENT — PAIN DESCRIPTION - ORIENTATION: ORIENTATION: RIGHT;LEFT

## 2023-11-03 ASSESSMENT — PAIN DESCRIPTION - LOCATION: LOCATION: ARM

## 2023-11-03 ASSESSMENT — PAIN SCALES - GENERAL: PAINLEVEL_OUTOF10: 4

## 2023-11-03 ASSESSMENT — PAIN DESCRIPTION - PAIN TYPE: TYPE: ACUTE PAIN

## 2023-11-03 NOTE — PROGRESS NOTES
cabinets   Short Term Goal 6 Increase rt hand strength:  by 10# & pinch (lateral , bauer, tip) by 2# so that pt will be able to open jars/bottles easier. Short Term Goal 7 Increase lt hand strength:  by 10# & pinch (lateral, bauer, tip) by 2# so that pt will be able to open jars/bottles easier. Short Term Goal 8 Pt will report improved bilateral hand coordination for fine motor ADLs (button manipulation,cutting food). Pt will complete 9 hole peg test 10 seconds quicker than eval with rt hand/lt hand. Short Term Goal 9 Pt will report increase UEFI total score by 10 points & report that doing ADLS & light IADLs are a little easier to do. STG 9 Current Status: UEFI total score is 34   STG Goal 9 Status: Met   Long Term Goals   Time Frame for Long Term Goals  20 visits   Long Term Goal 1 Compared to eval:Pt will report increase UEFI total score by 15-20 points & report that doing ADLS & light IADLs are a little easier to do. Long Term Goal 2 Pt will report decrease in bilateral UE pain to 3 when she completes her daily activities and exercises. Long Term Goal 3 Compared to eval :Decrease muscle tightness & Increase PROM bilateral UE shoulder (flexion, abduction) by 20   Long Term Goal 4 Compared to eval : Increase AROM by 20 rt shoulder flexion & abduction, & by 15 rt elbow to improve functional motion for getting dressed & reaching into cabinets. Long Term Goal 5 Compared to eval: Increase AROM by  20 lt shoulder flexion & abduction to improve functional motion for getting dressed & reaching into cabinets. Long Term Goal 6 Compared to eval: Increase rt hand strength:  by 15-20# & pinch (lateral , bauer, tip) by 4-5 # so that pt will be able to open jars/bottles easier. Long Term Goal 7 Compared to eval: Increase lt hand strength:  by 15-20# & pinch (lateral, bauer, tip) by 4-5# so that pt will be able to open jars/bottles easier.    Long Term Goal 8 Compared to eval:Pt will report

## 2023-11-06 ENCOUNTER — HOSPITAL ENCOUNTER (OUTPATIENT)
Dept: OCCUPATIONAL THERAPY | Age: 23
Setting detail: THERAPIES SERIES
Discharge: HOME | End: 2023-11-06
Payer: MEDICAID

## 2023-11-06 PROCEDURE — 97110 THERAPEUTIC EXERCISES: CPT

## 2023-11-06 PROCEDURE — 97140 MANUAL THERAPY 1/> REGIONS: CPT

## 2023-11-06 ASSESSMENT — PAIN DESCRIPTION - ORIENTATION: ORIENTATION: LEFT;RIGHT

## 2023-11-06 ASSESSMENT — PAIN DESCRIPTION - PAIN TYPE: TYPE: ACUTE PAIN

## 2023-11-06 ASSESSMENT — PAIN SCALES - GENERAL: PAINLEVEL_OUTOF10: 7

## 2023-11-06 NOTE — PROGRESS NOTES
dressed & reaching into cabinets. 6. Compared to eval: Increase rt hand strength:  by 15-20# & pinch (lateral , bauer, tip) by 4-5 # so that pt will be able to open jars/bottles easier. 7. Compared to eval: Increase lt hand strength:  by 15-20# & pinch (lateral, bauer, tip) by 4-5# so that pt will be able to open jars/bottles easier. 8. Compared to eval:Pt will report improved bilateral hand coordination for fine motor ADLs (button manipulation,cutting food). Pt will complete 9 hole peg test 20 seconds quicker than eval with rt hand/lt hand. 9. Increase lt UE strength: shoulder (flexion,abdcutor) to 3+/5, shoulder (IR,ER) to 4/5,elbow (flexor,extensor) to 4/5, forearm(supinator,pronator) to 4/5, wrist(flexor,extensor) to 4/5 so that pt can put more wt on her arm when getting out of chair, perform light housework, & light lift/carry. 10. Increase rt UE strength:shoulder (flexion,abdcutor) to 3+/5, shoulder (IR,ER) to 4/5,elbow (flexor,extensor) to 4/5, forearm(supinator,pronator) to 4/5, wrist(flexor,extensor) to 4/5 so that pt can put more wt on her arm when getting out of chair, perform light housework, & light lift/carry.                    TREATMENT PLAN   REQUIRES OT FOLLOW-UP: Yes  Type: Outpatient  Plan  Plan Frequency: 2x/week  Plan Weeks: 20 visits  Current Treatment Recommendations: Strengthening, ROM, Pain management, Coordination training, Patient/Caregiver education & training  Additional Comments: continue OT       Therapy Time  Individual Time In: 2525  Individual Time Out: 1030  Minutes: 44  Timed Code Treatment Minutes: 44 Minutes       Electronically signed by Jairo Padilla OT  on 11/6/2023 at 3:37 PM       Treatment Charges: Mins Units Time In/Out   [] Evaluation       []  Low       []  Moderate       []  High      []  Electrical Stim      []  Iontophoresis      []  Paraffin      []  Ultrasound        []  Masage      []

## 2023-11-10 ENCOUNTER — HOSPITAL ENCOUNTER (OUTPATIENT)
Dept: OCCUPATIONAL THERAPY | Age: 23
Setting detail: THERAPIES SERIES
Discharge: HOME | End: 2023-11-10
Payer: MEDICAID

## 2023-11-10 PROCEDURE — 97140 MANUAL THERAPY 1/> REGIONS: CPT

## 2023-11-10 PROCEDURE — 97110 THERAPEUTIC EXERCISES: CPT

## 2023-11-10 ASSESSMENT — PAIN DESCRIPTION - ORIENTATION: ORIENTATION: RIGHT;LEFT

## 2023-11-10 ASSESSMENT — PAIN DESCRIPTION - LOCATION: LOCATION: ARM;JAW;NECK

## 2023-11-10 ASSESSMENT — PAIN DESCRIPTION - PAIN TYPE: TYPE: ACUTE PAIN

## 2023-11-10 ASSESSMENT — PAIN SCALES - GENERAL: PAINLEVEL_OUTOF10: 6

## 2023-11-10 NOTE — PROGRESS NOTES
[address] [place]. [address]         Phone: [phone]       Fax: [phone]     Occupational Therapy Daily Treatment note     Occupational Therapy: Daily Note   Patient: Dilip Hummel (73 y.o. female)   Examination Date: 11/10/2023  No data recorded  No data recorded   :  2000 # of Visits since [facility]:   15   MRN: [id number]  CSN: [id number] Start of Care Date: 2023   Insurance: Payor: [employer] [place] / Plan: [employer] [place] / Product Type: *No Product type* /   Insurance ID: [id number] - (Medicaid Managed) Secondary Insurance (if applicable): Insurance Information: [employer]  (30 visits OT then pt will need authorization for futher visits). Referring Physician: MD Dr Shilo Mcgill MD   PCP: Merline Crawley, PA Visits to Date/Visits Approved:     No Show/Cancelled Appts:   /       Medical Diagnosis: Pain in both wrists [M25.531, M25.532]  Complex regional pain syndrome type 1, affecting unspecified site [G90.50] pain in both wrists (M25.531, M25.532) ICD-10 code, complex regional pain syndrome type 1 affection unspecified site (G90.50) ICD-10 code  Treatment Diagnosis: bilateral UE & hand weakness,impaired coordination, impaired sensation, pain, stiffness( bilateral shoulders,elbow flexion,supination,wrist UD) & rt wrist extension (ICD-10 codes: M62.81, R27.9, R20.2, M79.621, M79.622, M79.631, M79.632, M79.641, M79.642)        SUBJECTIVE EXAMINATION   Pain Level: Pain Screening  Patient Currently in Pain: Yes  Pain Assessment: 0-10  Pain Level: 6 (Pain 6 bilateral arms, pain 5 neck/jaw. )  Post Treatment Pain Level: 6 (Pain 6 bilateral arms, pain 5 neck/jaw.)  Pain Type: Acute pain  Pain Location: Arm, Jaw, Neck  Pain Orientation: Right, Left  Pain Descriptors: Sharp, Spasm, Aching, Dull    Patient Comments: Subjective: Pt states that she has bilateral

## 2023-11-13 ENCOUNTER — APPOINTMENT (OUTPATIENT)
Dept: OCCUPATIONAL THERAPY | Age: 23
End: 2023-11-13
Payer: MEDICAID

## 2023-11-15 ENCOUNTER — HOSPITAL ENCOUNTER (OUTPATIENT)
Dept: OCCUPATIONAL THERAPY | Age: 23
Setting detail: THERAPIES SERIES
Discharge: HOME | End: 2023-11-15
Payer: MEDICAID

## 2023-11-15 PROCEDURE — 97140 MANUAL THERAPY 1/> REGIONS: CPT

## 2023-11-15 PROCEDURE — 97110 THERAPEUTIC EXERCISES: CPT

## 2023-11-15 ASSESSMENT — PAIN SCALES - GENERAL: PAINLEVEL_OUTOF10: 8

## 2023-11-15 ASSESSMENT — PAIN DESCRIPTION - ORIENTATION: ORIENTATION: LEFT;RIGHT

## 2023-11-15 ASSESSMENT — PAIN DESCRIPTION - PAIN TYPE: TYPE: ACUTE PAIN

## 2023-11-15 ASSESSMENT — PAIN DESCRIPTION - LOCATION: LOCATION: ARM;BACK;GENERALIZED;LEG

## 2023-11-15 ASSESSMENT — PAIN DESCRIPTION - DESCRIPTORS: DESCRIPTORS: SHARP;SPASM;ACHING

## 2023-11-15 NOTE — PROGRESS NOTES
perform light housework, & light lift/carry. 10. Increase rt UE strength:shoulder (flexion,abdcutor) to 3+/5, shoulder (IR,ER) to 4/5,elbow (flexor,extensor) to 4/5, forearm(supinator,pronator) to 4/5, wrist(flexor,extensor) to 4/5 so that pt can put more wt on her arm when getting out of chair, perform light housework, & light lift/carry.                    TREATMENT PLAN   REQUIRES OT FOLLOW-UP: Yes  Type: Outpatient  Plan  Plan Frequency: 2x/week  Plan Weeks: 20 visits  Current Treatment Recommendations: Strengthening, ROM, Pain management, Coordination training, Patient/Caregiver education & training  Additional Comments: continue OT       Therapy Time  Individual Time In: 6756  Individual Time Out: 2790  Minutes: 41  Timed Code Treatment Minutes: 36 Minutes       Electronically signed by Awais Hastings OT  on 11/15/2023 at 11:07 AM       Treatment Charges: Mins Units Time In/Out   [] Evaluation       []  Low       []  Moderate       []  High      []  Electrical Stim      []  Iontophoresis      []  Paraffin      []  Ultrasound        []  Masage      []  Neuromuscular Re-ed      []  ADL      [x]  Ther Exercise      []  Ther Activities 16 1 10:02-10:18   []  Neuro Re-Ed      [x] cold pack  5 0 10:18-10:23   [x]  manual therapy  20 1 09:42-10:02   Total Treatment time 41 min           POC NOTE

## 2023-11-17 ENCOUNTER — HOSPITAL ENCOUNTER (OUTPATIENT)
Dept: OCCUPATIONAL THERAPY | Age: 23
Setting detail: THERAPIES SERIES
Discharge: HOME | End: 2023-11-17
Payer: MEDICAID

## 2023-11-17 PROCEDURE — 97140 MANUAL THERAPY 1/> REGIONS: CPT

## 2023-11-17 PROCEDURE — 97110 THERAPEUTIC EXERCISES: CPT

## 2023-11-17 ASSESSMENT — PAIN SCALES - GENERAL: PAINLEVEL_OUTOF10: 8

## 2023-11-17 ASSESSMENT — PAIN DESCRIPTION - PAIN TYPE: TYPE: ACUTE PAIN

## 2023-11-17 ASSESSMENT — PAIN DESCRIPTION - ORIENTATION: ORIENTATION: RIGHT;LEFT

## 2023-11-17 NOTE — PROGRESS NOTES
into cabinets         6. Increase rt hand strength:  by 10# & pinch (lateral , bauer, tip) by 2# so that pt will be able to open jars/bottles easier. 7. Increase lt hand strength:  by 10# & pinch (lateral, bauer, tip) by 2# so that pt will be able to open jars/bottles easier. 8. Pt will report improved bilateral hand coordination for fine motor ADLs (button manipulation,cutting food). Pt will complete 9 hole peg test 10 seconds quicker than eval with rt hand/lt hand. 9. Pt will report increase UEFI total score by 10 points & report that doing ADLS & light IADLs are a little easier to do. Long Term Goals Completed by 20 visits Current Status Goal Status   Compared to eval:Pt will report increase UEFI total score by 15-20 points & report that doing ADLS & light IADLs are a little easier to do. 2. Pt will report decrease in bilateral UE pain to 3 when she completes her daily activities and exercises. 3. Compared to eval :Decrease muscle tightness & Increase PROM bilateral UE shoulder (flexion, abduction) by 20       4. Compared to eval : Increase AROM by 20 rt shoulder flexion & abduction, & by 15 rt elbow to improve functional motion for getting dressed & reaching into cabinets. 5. Compared to eval: Increase AROM by  20 lt shoulder flexion & abduction to improve functional motion for getting dressed & reaching into cabinets. 6. Compared to eval: Increase rt hand strength:  by 15-20# & pinch (lateral , bauer, tip) by 4-5 # so that pt will be able to open jars/bottles easier. 7. Compared to eval: Increase lt hand strength:  by 15-20# & pinch (lateral, bauer, tip) by 4-5# so that pt will be able to open jars/bottles easier. 8. Compared to eval:Pt will report improved bilateral hand coordination for fine motor ADLs (button manipulation,cutting food).  Pt will complete 9 hole peg test 20

## 2023-11-20 ENCOUNTER — APPOINTMENT (OUTPATIENT)
Dept: OCCUPATIONAL THERAPY | Age: 23
End: 2023-11-20
Payer: MEDICAID

## 2023-11-21 ENCOUNTER — APPOINTMENT (OUTPATIENT)
Dept: OCCUPATIONAL THERAPY | Age: 23
End: 2023-11-21
Payer: MEDICAID

## 2023-11-29 ENCOUNTER — APPOINTMENT (OUTPATIENT)
Dept: OCCUPATIONAL THERAPY | Age: 23
End: 2023-11-29
Payer: MEDICAID

## 2023-12-01 ENCOUNTER — HOSPITAL ENCOUNTER (OUTPATIENT)
Dept: OCCUPATIONAL THERAPY | Age: 23
Setting detail: THERAPIES SERIES
Discharge: HOME | End: 2023-12-01
Payer: MEDICAID

## 2023-12-01 PROCEDURE — 97140 MANUAL THERAPY 1/> REGIONS: CPT

## 2023-12-01 PROCEDURE — 97110 THERAPEUTIC EXERCISES: CPT

## 2023-12-01 ASSESSMENT — PAIN DESCRIPTION - LOCATION: LOCATION: ARM;LEG;NECK

## 2023-12-01 ASSESSMENT — PAIN DESCRIPTION - ORIENTATION: ORIENTATION: LEFT;RIGHT

## 2023-12-01 ASSESSMENT — PAIN SCALES - GENERAL: PAINLEVEL_OUTOF10: 5

## 2023-12-01 ASSESSMENT — PAIN DESCRIPTION - PAIN TYPE: TYPE: ACUTE PAIN

## 2023-12-01 NOTE — PROGRESS NOTES
wrist(flexor,extensor) to 4/5 so that pt can put more wt on her arm when getting out of chair, perform light housework, & light lift/carry. 10. Increase rt UE strength:shoulder (flexion,abdcutor) to 3+/5, shoulder (IR,ER) to 4/5,elbow (flexor,extensor) to 4/5, forearm(supinator,pronator) to 4/5, wrist(flexor,extensor) to 4/5 so that pt can put more wt on her arm when getting out of chair, perform light housework, & light lift/carry.                    TREATMENT PLAN   REQUIRES OT FOLLOW-UP: Yes  Type: Outpatient  Plan  Plan Frequency: 2x/week  Plan Weeks: 20 visits  Current Treatment Recommendations: Strengthening, ROM, Pain management, Coordination training, Patient/Caregiver education & training  Additional Comments: continue OT       Therapy Time  Individual Time In: 3390  Individual Time Out: 1000  Minutes: 45  Timed Code Treatment Minutes: 45 Minutes       Electronically signed by Delvis Morgan OT  on 12/1/2023 at 5:29 PM       Treatment Charges: Mins Units Time In/Out   [] Evaluation       []  Low       []  Moderate       []  High      []  Electrical Stim      []  Iontophoresis      []  Paraffin      []  Ultrasound        []  Masage      []  Neuromuscular Re-ed      []  ADL      [x]  Ther Exercise 25 2 09;35-10:00   []  Ther Activities      []  Neuro Re-Ed      []  Splinting      [x] manual therapy  20 1 09:15-09:35   Total Treatment time 45 min 3          POC NOTE

## 2023-12-05 ENCOUNTER — HOSPITAL ENCOUNTER (OUTPATIENT)
Dept: OCCUPATIONAL THERAPY | Age: 23
Setting detail: THERAPIES SERIES
Discharge: HOME | End: 2023-12-05
Payer: MEDICAID

## 2023-12-05 NOTE — PROGRESS NOTES
[facility]   OCCUPATIONAL THERAPY MISSED TREATMENT NOTE   OUTPATIENT   Date: 23  Patient Name: Michelle Rascon       MRN: [id number]   Account #: [de-identified]    : 2000  (20 y.o.)  Gender: female   Diagnosis: pain in both wrists (M25.531, M25.532) ICD-10 code, complex regional pain syndrome type 1 affection unspecified site (G90.50) ICD-10 code  OT Visit Information  Onset Date: 22  OT Insurance Information: [employer]  (30 visits OT then pt will need authorization for futher visits). Total # of Visits Approved: 21  Canceled Appointment: 1     REASON FOR MISSED TREATMENT:         Patient cancelled due to illness . Pt called and cancel therapy today d/t not feeling well.       Electronically signed by Isacc Garcia OT on 23 at 10:01 AM EST

## 2023-12-08 ENCOUNTER — HOSPITAL ENCOUNTER (OUTPATIENT)
Dept: OCCUPATIONAL THERAPY | Age: 23
Setting detail: THERAPIES SERIES
Discharge: HOME | End: 2023-12-08
Payer: MEDICAID

## 2023-12-08 ENCOUNTER — OFFICE VISIT (OUTPATIENT)
Dept: PRIMARY CARE CLINIC | Age: 23
End: 2023-12-08
Payer: MEDICAID

## 2023-12-08 VITALS — WEIGHT: 162.8 LBS | OXYGEN SATURATION: 96 % | HEIGHT: 63 IN | BODY MASS INDEX: 28.84 KG/M2 | HEART RATE: 88 BPM

## 2023-12-08 DIAGNOSIS — G90.513 COMPLEX REGIONAL PAIN SYNDROME TYPE 1 OF BOTH UPPER EXTREMITIES: Primary | ICD-10-CM

## 2023-12-08 PROCEDURE — 97110 THERAPEUTIC EXERCISES: CPT

## 2023-12-08 PROCEDURE — 97140 MANUAL THERAPY 1/> REGIONS: CPT

## 2023-12-08 PROCEDURE — 99214 OFFICE O/P EST MOD 30 MIN: CPT | Performed by: PHYSICIAN ASSISTANT

## 2023-12-08 ASSESSMENT — PAIN DESCRIPTION - ORIENTATION: ORIENTATION: LEFT;RIGHT

## 2023-12-08 ASSESSMENT — PAIN DESCRIPTION - PAIN TYPE: TYPE: ACUTE PAIN

## 2023-12-08 ASSESSMENT — PAIN DESCRIPTION - LOCATION: LOCATION: ARM;LEG;NECK

## 2023-12-08 ASSESSMENT — PAIN SCALES - GENERAL: PAINLEVEL_OUTOF10: 5

## 2023-12-08 NOTE — PROGRESS NOTES
Assessment: Assessment: Pt demo progress by raising both arms for full shoulder flexion today. Pt reports writing cards this week which is progress. Pt participates in bilateral UE therapeutic exercises for rom,stretching,coordination, wt bearing, & strengthening. PROM bilateral UE completed with pt supine. Gloves not worn by OT during PROM d/t pt's continued skin sensitivity. Pt report soreness rt elbow if OT holds it  during PROM. Pt tolerates bilateral scapula mobilization but scapula muscles more sensitive than lt. Pt only able to press cone in putty 6x each UE today for UE wt bearing & strengthening. See OT exercises for details. Pt will benefit from continued skilled OT to provide manual therapy (19923 ) , therapeutic exercises (47407), therapeutic activities ([address] ) to facilitate bilateral UE rom, coordination, strength, desensitization, decrease pain,to improve pt's participation & independence for functional activities/light IADL. Performance deficits / Impairments: Decreased ROM, Decreased strength, Decreased high-level IADLs, Decreased sensation, Decreased coordination, Decreased fine motor control    Post-Treatment Pain Level: 5    Prognosis: Good    Activity Tolerance: Patient tolerated treatment well             GOALS   Patient Goals   Patient goals : To decrease pain, increase bilateral motion & strength so she can do more of her daily activities    Short Term Goals Completed by 10 visits Current Status Goal Status   Pt will demo & report modified independence with bilateral UE HEP. 2. Pt will report decrease in bilateral UE pain to 5 when she completes her daily activities and exercises. 3. Decrease muscle tightness & Increase PROM bilateral UE shoulder (flexion, abduction) by 10, lt forearm supination by 10, & lt wrist extension by 10       4.  Increase AROM rt UE by 10 (shoulder flexion & abduction, elbow flexion, forearm supination/pronation, wrist flexion/extension) & by 5 for wrist

## 2023-12-11 ENCOUNTER — HOSPITAL ENCOUNTER (OUTPATIENT)
Dept: OCCUPATIONAL THERAPY | Age: 23
Setting detail: THERAPIES SERIES
Discharge: HOME | End: 2023-12-11
Payer: MEDICAID

## 2023-12-11 PROCEDURE — 97110 THERAPEUTIC EXERCISES: CPT

## 2023-12-11 NOTE — FLOWSHEET NOTE
desensitization, decrease pain,to improve pt's participation & independence for functional activities/light IADL. STG/LTG  Patient goals : To decrease pain, increase bilateral motion & strength so she can do more of her daily activities     Short Term Goals Completed by 10 visits Current Status Goal Status   Pt will demo & report modified independence with bilateral UE HEP. 2. Pt will report decrease in bilateral UE pain to 5 when she completes her daily activities and exercises. 3. Decrease muscle tightness & Increase PROM bilateral UE shoulder (flexion, abduction) by 10, lt forearm supination by 10, & lt wrist extension by 10     4. Increase AROM rt UE by 10 (shoulder flexion & abduction, elbow flexion, forearmsupination/pronation, wrist flexion/extension) & by 5 for wrist UD to improve functional motion for getting dressed & reaching into cabinets. 5. Increase AROM lt UE by 10 for shoulder flexion & abduction, forearm supinaiton/pronation, & by 5 for wrist UD to improve functional motion for getting dressed & reaching into cabinets       6. Increase rt hand strength:  by 10# & pinch (lateral , bauer, tip) by 2# so that pt will be able to open jars/bottles easier. 7. Increase lt hand strength:  by 10# & pinch (lateral, bauer, tip) by 2# so that pt will be able to open jars/bottles easier. 8. Pt will report improved bilateral hand coordination for fine motor ADLs (button manipulation,cutting food). Pt will complete 9 hole peg test 10 seconds quicker than eval with rt hand/lt hand. 9. Pt will report increase UEFI total score by 10 points & report that doing ADLS & light IADLs are a little easier to do.                                                        Long Term Goals Completed by 20 visits Current Status Goal Status   Compared to eval:Pt will report increase UEFI total score by 15-20 points & report

## 2023-12-13 ENCOUNTER — TELEPHONE (OUTPATIENT)
Dept: PRIMARY CARE CLINIC | Age: 23
End: 2023-12-13

## 2023-12-13 NOTE — TELEPHONE ENCOUNTER
Called the medical group and they need me to set up an account. I was unable to do this at the time. Patient was educated to call ordering provider and to get test results ordered by them.

## 2023-12-13 NOTE — TELEPHONE ENCOUNTER
Patient called stating that the ordering provider needs to contact [facility] to request kit for [id number] Food Sensitivity Test.    Patient provided number [phone].    Please call to order & update patient.

## 2023-12-15 ENCOUNTER — HOSPITAL ENCOUNTER (OUTPATIENT)
Dept: OCCUPATIONAL THERAPY | Age: 23
Setting detail: THERAPIES SERIES
Discharge: HOME | End: 2023-12-15
Payer: MEDICAID

## 2023-12-15 PROCEDURE — 97140 MANUAL THERAPY 1/> REGIONS: CPT

## 2023-12-15 PROCEDURE — 97110 THERAPEUTIC EXERCISES: CPT

## 2023-12-15 ASSESSMENT — PAIN DESCRIPTION - LOCATION: LOCATION: ARM;BACK;NECK;LEG

## 2023-12-15 ASSESSMENT — PAIN DESCRIPTION - ORIENTATION: ORIENTATION: LEFT;RIGHT

## 2023-12-15 ASSESSMENT — PAIN SCALES - GENERAL: PAINLEVEL_OUTOF10: 7

## 2023-12-15 NOTE — PROGRESS NOTES
[address] [place]         Phone: [phone]       Fax: [phone]     Occupational Therapy Daily Treatment note     Occupational Therapy: Daily Note   Patient: Suman Rivera (69 y.o. female)   Examination Date: 12/15/2023  No data recorded  No data recorded   :  2000 # of Visits since [facility]:   23   MRN: [id number]  CSN: [id number] Start of Care Date: 2023   Insurance: Payor: [employer] [place] [place] / Plan: [employer] [place] / Product Type: *No Product type* /   Insurance ID: [id number] - (Medicaid Managed) Secondary Insurance (if applicable): Insurance Information: [employer]  (30 visits OT then pt will need authorization for futher visits).    Referring Physician: MD Dr Henry Marlow MD , pt's new MD - Dr Tab Knott   PCP: ATA Vinson Visits to Date/Visits Approved:     No Show/Cancelled Appts:   /       Medical Diagnosis: Pain in both wrists [M25.531, M25.532]  Complex regional pain syndrome type 1, affecting unspecified site [G90.50] pain in both wrists (M25.531, M25.532) ICD-10 code, complex regional pain syndrome type 1 affection unspecified site (G90.50) ICD-10 code  Treatment Diagnosis: bilateral UE & hand weakness,impaired coordination, impaired sensation, pain, stiffness( bilateral shoulders,elbow flexion,supination,wrist UD) & rt wrist extension (ICD-10 codes: M62.81, R27.9, R20.2, M79.621, M79.622, M79.631, M79.632, M79.641, M79.642)        SUBJECTIVE EXAMINATION   Pain Level: Pain Screening  Patient Currently in Pain: Yes  Pain Assessment: 0-10  Pain Level: 7 (Pain 7 rt arm, pain 4  lt arm,back,neck,legs)  Post Treatment Pain Level: 7 (Pain 7 rt arm, pain 4 lt arm,back,neck,legs)  Pain Location: Arm, Back, Neck, Leg  Pain Orientation: Left, Right  Pain Descriptors: Aching, Sharp, Spasm, Numbness, Tingling    Patient Comments:

## 2023-12-18 ENCOUNTER — APPOINTMENT (OUTPATIENT)
Dept: OCCUPATIONAL THERAPY | Age: 23
End: 2023-12-18
Payer: MEDICAID

## 2023-12-21 DIAGNOSIS — R82.998 URINE LEUKOCYTES: ICD-10-CM

## 2023-12-22 LAB
CULTURE: NORMAL
SPECIMEN DESCRIPTION: NORMAL

## 2023-12-24 LAB — MAGNESIUM RBC: 4.9 MG/DL (ref 3.6–7.5)

## 2023-12-29 ENCOUNTER — HOSPITAL ENCOUNTER (OUTPATIENT)
Dept: OCCUPATIONAL THERAPY | Age: 23
Setting detail: THERAPIES SERIES
Discharge: HOME | End: 2023-12-29
Payer: MEDICAID

## 2023-12-29 PROCEDURE — 97110 THERAPEUTIC EXERCISES: CPT

## 2023-12-29 PROCEDURE — 97140 MANUAL THERAPY 1/> REGIONS: CPT

## 2023-12-29 NOTE — PROGRESS NOTES
desensitization, wt bearing, & strengthening exercises. Pt able to grasp/place a few red higher resistance clothespins today. Pt completes tx with pain staying at 7. Bilateral UE PROM -wfls. Pt has sensitivity with hand placement near her elbows & lt upper trapezius. See OT exercises for details. Pt will benefit from continued skilled OT to provide manual therapy (10818 ) , therapeutic exercises (01271), therapeutic activities ( ) to facilitate bilateral UE rom, coordination, strength, desensitization, decrease pain,to improve pt's participation & independence for functional activities/light IADL. Performance deficits / Impairments: Decreased ROM, Decreased strength, Decreased high-level IADLs, Decreased sensation, Decreased coordination, Decreased fine motor control    Post-Treatment Pain Level: 7    Prognosis: Good    Activity Tolerance: Patient tolerated treatment well             GOALS   Patient Goals   Patient goals : To decrease pain, increase bilateral motion & strength so she can do more of her daily activities. Short Term Goals Completed by 10 visits Current Status Goal Status   Pt will demo & report modified independence with bilateral UE HEP. 2. Pt will report decrease in bilateral UE pain to 5 when she completes her daily activities and exercises. 3. Decrease muscle tightness & Increase PROM bilateral UE shoulder (flexion, abduction) by 10, lt forearm supination by 10, & lt wrist extension by 10       4. Increase AROM rt UE by 10 (shoulder flexion & abduction, elbow flexion, forearm supination/pronation, wrist flexion/extension) & by 5 for wrist UD to improve functional motion for getting dressed & reaching into cabinets. 5. Increase AROM lt UE by 10 for shoulder flexion & abduction, forearm supination/pronation, & by 5 for wrist UD to improve functional motion for getting dressed & reaching into cabinets         6.  Increase rt hand strength:  by 10# & pinch (lateral , bauer

## 2024-01-02 ENCOUNTER — HOSPITAL ENCOUNTER (OUTPATIENT)
Dept: OCCUPATIONAL THERAPY | Age: 24
Setting detail: THERAPIES SERIES
Discharge: HOME | End: 2024-01-02
Payer: MEDICAID

## 2024-01-02 PROCEDURE — 97140 MANUAL THERAPY 1/> REGIONS: CPT

## 2024-01-02 PROCEDURE — 97110 THERAPEUTIC EXERCISES: CPT

## 2024-01-02 ASSESSMENT — PAIN DESCRIPTION - PAIN TYPE: TYPE: ACUTE PAIN

## 2024-01-02 ASSESSMENT — PAIN SCALES - GENERAL: PAINLEVEL_OUTOF10: 5

## 2024-01-02 ASSESSMENT — PAIN DESCRIPTION - LOCATION: LOCATION: ARM;BACK;NECK;LEG

## 2024-01-02 ASSESSMENT — PAIN DESCRIPTION - ORIENTATION: ORIENTATION: RIGHT;LEFT

## 2024-01-02 NOTE — PROGRESS NOTES
[facility]   [place] [facility]ational[facility]  [address]         Phone: [phone]       Fax: [phone]     Occupational Therapy Daily Treatment note     Occupational Therapy: Daily Note   Patient: Veronica Marie Fritsch (23 y.o. female)   Examination Date: 2024  No data recorded  No data recorded   :  2000 # of Visits since SOC:   23   MRN: [id number]  CSN: [id number] Start of Care Date: 2023   Insurance: Payor[place] [employer] [place] / Plan: [employer] / Product Type: *No Product type* /   Insurance ID: [id number] - (Medicaid Managed) Secondary Insurance (if applicable):    Insurance Information: [employer]  (30 visits OT then pt will need authorization for futher visits).   Referring Physician: Josh Patel MD pt's new MD - Dr Leon Schaeffer   PCP: Valentín Prasad PA Visits to Date/Visits Approved:  (1 visit in ) / 40    No Show/Cancelled Appts:   /       Medical Diagnosis: Pain in both wrists [M25.531, M25.532]  Complex regional pain syndrome type 1, affecting unspecified site [G90.50] pain in both wrists (M25.531, M25.532) ICD-10 code, complex regional pain syndrome type 1 affection unspecified site (G90.50) ICD-10 code  Treatment Diagnosis: bilateral UE & hand weakness,impaired coordination, impaired sensation, pain, stiffness( bilateral shoulders,elbow flexion,supination,wrist UD) & rt wrist extension (ICD-10 codes: M62.81, R27.9, R20.2, M79.621, M79.622, M79.631, M79.632, M79.641, M79.642)        SUBJECTIVE EXAMINATION   Pain Level: Pain Screening  Patient Currently in Pain: Yes  Pain Assessment: 0-10  Pain Level: 5  Post Treatment Pain Level: 5  Pain Type: Acute pain  Pain Location: Arm, Back, Neck, Leg  Pain Orientation: Right, Left  Pain Descriptors: Aching, Sore, Sharp, Numbness, Tingling    Patient Comments: Subjective: Pt states she didn't feel well over the weekend but is doing good

## 2024-01-04 ENCOUNTER — HOSPITAL ENCOUNTER (OUTPATIENT)
Dept: OCCUPATIONAL THERAPY | Age: 24
Setting detail: THERAPIES SERIES
Discharge: HOME | End: 2024-01-04
Payer: MEDICAID

## 2024-01-04 PROCEDURE — 97140 MANUAL THERAPY 1/> REGIONS: CPT

## 2024-01-04 PROCEDURE — 97110 THERAPEUTIC EXERCISES: CPT

## 2024-01-04 ASSESSMENT — PAIN DESCRIPTION - LOCATION: LOCATION: ARM;BACK;NECK

## 2024-01-04 ASSESSMENT — PAIN DESCRIPTION - PAIN TYPE: TYPE: ACUTE PAIN

## 2024-01-04 ASSESSMENT — PAIN SCALES - GENERAL: PAINLEVEL_OUTOF10: 6

## 2024-01-04 ASSESSMENT — PAIN DESCRIPTION - ORIENTATION: ORIENTATION: RIGHT;LEFT

## 2024-01-09 ENCOUNTER — HOSPITAL ENCOUNTER (OUTPATIENT)
Dept: OCCUPATIONAL THERAPY | Age: 24
Setting detail: THERAPIES SERIES
Discharge: HOME | End: 2024-01-09
Payer: MEDICAID

## 2024-01-09 PROCEDURE — 97110 THERAPEUTIC EXERCISES: CPT

## 2024-01-09 PROCEDURE — 97140 MANUAL THERAPY 1/> REGIONS: CPT

## 2024-01-09 ASSESSMENT — PAIN DESCRIPTION - ORIENTATION: ORIENTATION: LEFT;RIGHT

## 2024-01-09 ASSESSMENT — PAIN DESCRIPTION - PAIN TYPE: TYPE: ACUTE PAIN

## 2024-01-09 ASSESSMENT — PAIN SCALES - GENERAL: PAINLEVEL_OUTOF10: 4

## 2024-01-09 ASSESSMENT — PAIN DESCRIPTION - LOCATION: LOCATION: ARM;LEG;BACK;NECK

## 2024-01-09 NOTE — PROGRESS NOTES
[facility]  [address]         Phone: [phone]       Fax: [phone]     Occupational Therapy Daily Treatment note     Occupational Therapy: Daily Note   Patient: Veronica Marie Fritsch (23 y.o. female)   Examination Date: 2024  No data recorded  No data recorded   :  2000 # of Visits since SOC:   25   MRN: [id number]  CSN: [id number] Start of Care Date: 2023   Insurance: Pay[place]: [employer] / Plan: [employer] / Product Type: *No Product type* /   Insurance ID: [id number] - (Medicaid Managed) Secondary Insurance (if applicable):    Insurance Information: [employer] Medicaid  (30 visits OT then pt will need authorization for futher visits).   Referring Physician: Josh Patel MD pt's new MD - Dr Leon Schaeffer   PCP: Valentín Prasad PA Visits to Date/Visits Approved:  (visit 3 in ) / 40    No Show/Cancelled Appts:   /       Medical Diagnosis: Pain in both wrists [M25.531, M25.532]  Complex regional pain syndrome type 1, affecting unspecified site [G90.50] pain in both wrists (M25.531, M25.532) ICD-10 code, complex regional pain syndrome type 1 affection unspecified site (G90.50) ICD-10 code  Treatment Diagnosis: bilateral UE & hand weakness,impaired coordination, impaired sensation, pain, stiffness( bilateral shoulders,elbow flexion,supination,wrist UD) & rt wrist extension (ICD-10 codes: M62.81, R27.9, R20.2, M79.621, M79.622, M79.631, M79.632, M79.641, M79.642)        SUBJECTIVE EXAMINATION   Pain Level: Pain Screening  Patient Currently in Pain: Yes  Pain Assessment: 0-10  Pain Level: 4 (Pain 4  except for lt elbow pain 7)  Post Treatment Pain Level: 4 (Pain 4 except for lt elbow pain 8)  Pain Type: Acute pain  Pain Location: Arm, Leg, Back, Neck  Pain Orientation: Left, Right  Pain Descriptors: Aching, Sore, Numbness, Tingling, Sharp    Patient Comments: 
training  Additional Comments: Continue OT       Therapy Time        01/09/24 0922   OT Individual Minutes   Time In 0927   Time Out 1029   Minutes 62   OT Concurrent Minutes   Time In 0922   Time Out 0927   Minutes 5   Time Code Minutes    Timed Code Treatment Minutes 64 Minutes       Electronically signed by Loreto Moore OT  on 1/9/2024 at 11:10 AM       Treatment Charges: Mins Units Time In/Out   [] Evaluation       []  Low       []  Moderate       []  High      []  Electrical Stim      []  Iontophoresis      []  Paraffin      []  Ultrasound        []  Masage      []  Neuromuscular Re-ed      []  ADL      [x]  Ther Exercise 47 3 09: 22-09:28,09:48-10:29   []  Ther Activities      []  Neuro Re-Ed      []  Splinting      [x]Manual therapy  20 1 09:28-09:48   Total Treatment time 67 min 4          POC NOTE

## 2024-01-12 ENCOUNTER — HOSPITAL ENCOUNTER (OUTPATIENT)
Dept: OCCUPATIONAL THERAPY | Age: 24
Setting detail: THERAPIES SERIES
Discharge: HOME | End: 2024-01-12
Payer: MEDICAID

## 2024-01-12 PROCEDURE — 97140 MANUAL THERAPY 1/> REGIONS: CPT

## 2024-01-12 PROCEDURE — 97110 THERAPEUTIC EXERCISES: CPT

## 2024-01-12 ASSESSMENT — PAIN DESCRIPTION - ORIENTATION: ORIENTATION: RIGHT;LEFT

## 2024-01-12 ASSESSMENT — PAIN DESCRIPTION - PAIN TYPE: TYPE: ACUTE PAIN

## 2024-01-12 ASSESSMENT — PAIN SCALES - GENERAL: PAINLEVEL_OUTOF10: 5

## 2024-01-12 ASSESSMENT — PAIN DESCRIPTION - LOCATION: LOCATION: ARM;BACK;NECK;LEG

## 2024-01-12 NOTE — PROGRESS NOTES
[facility]   [place] [facility] OccupationalTherapy  [address]         Phone: [phone]       Fax: [phone]     Occupational Therapy Daily Treatment note     Occupational Therapy: Daily Note   Patient: Veronica Marie Fritsch (23 y.o. female)   Examination Date: 2024  No data recorded  No data recorded   :  2000 # of Visits since SOC:   26   MRN: [id number]  CSN: [id number] Start of Care Date: 2023   Insurance: Pay[place] [employer] / [place]: [employer] / Product Type: *No Product type* /   Insurance ID: [id number] - (Medicaid Managed) Secondary Insurance (if applicable):    Insurance Information: [employer]  (30 visits OT then pt will need authorization for futher visits).   Referring Physician: Josh Patel MD pt's new MD - Dr Leon Schaeffer   PCP: Valentín Prasad PA Visits to Date/Visits Approved:  (visit 4 in ) / 40    No Show/Cancelled Appts:   /       Medical Diagnosis: Pain in both wrists [M25.531, M25.532]  Complex regional pain syndrome type 1, affecting unspecified site [G90.50] pain in both wrists (M25.531, M25.532) ICD-10 code, complex regional pain syndrome type 1 affection unspecified site (G90.50) ICD-10 code  Treatment Diagnosis: bilateral UE & hand weakness,impaired coordination, impaired sensation, pain, stiffness( bilateral shoulders,elbow flexion,supination,wrist UD) & rt wrist extension (ICD-10 codes: M62.81, R27.9, R20.2, M79.621, M79.622, M79.631, M79.632, M79.641, M79.642)        SUBJECTIVE EXAMINATION   Pain Level: Pain Screening  Patient Currently in Pain: Yes  Pain Assessment: 0-10  Pain Level: 5  Post Treatment Pain Level: 5  Pain Type: Acute pain  Pain Location: Arm, Back, Neck, Leg  Pain Orientation: Right, Left  Pain Descriptors: Aching, Sore, Numbness, Sharp, Tingling    Patient Comments: Subjective: Pt states she saw new pain doctor and she would like a copy of the 
99

## 2024-01-16 ENCOUNTER — HOSPITAL ENCOUNTER (OUTPATIENT)
Dept: OCCUPATIONAL THERAPY | Age: 24
Setting detail: THERAPIES SERIES
Discharge: HOME | End: 2024-01-16
Payer: MEDICAID

## 2024-01-16 PROCEDURE — 97110 THERAPEUTIC EXERCISES: CPT

## 2024-01-16 PROCEDURE — 97140 MANUAL THERAPY 1/> REGIONS: CPT

## 2024-01-16 ASSESSMENT — PAIN DESCRIPTION - ORIENTATION: ORIENTATION: RIGHT;LEFT

## 2024-01-16 ASSESSMENT — PAIN DESCRIPTION - PAIN TYPE: TYPE: ACUTE PAIN

## 2024-01-16 ASSESSMENT — PAIN SCALES - GENERAL: PAINLEVEL_OUTOF10: 7

## 2024-01-16 ASSESSMENT — PAIN DESCRIPTION - LOCATION: LOCATION: ARM;BACK;LEG;NECK

## 2024-01-16 NOTE — PROGRESS NOTES
8-9 but today it's 7 out of 10. Pt states she stayed at home yesterday.Pt states the numbness isn't as bad as when she 1st started OT last year but it's still there.    HEP Compliance: Fair/Good- Pt states she wasn't able to do theraputty yesterday because the putty was cold/hard & she couldn't squeeze it.         OBJECTIVE EXAMINATION   Restrictions: Restrictions/Precautions: None          TREATMENT     Exercises:     Treatment Reasoning    OT Exercise 1: PROM/AAROM  bilateral UE & hand (pt supine on mat) ,gentle soft tissue massage (upper trapezius, upper pectoral area) & scapula protraction/retraction gentle mobilization  (pt sitting edge of mat.)  OT Exercise 2: yellow small  therapy ball bilateral UE 2 set  10x each way( press into sides of ball & press top of ball), 2 set 10x scapula pro/retraction with elbow flex/extension reaching forward/back, 2 sets 10xeach way (UE horizontal abduction/adduction, shoulder flex/extension)- pt sitting edge of mat  OT Exercise 6: yellow theraband flex bar - using bilateral hands rolling bar forward/back on table (from hands to up forearms) t  0x, bilateral UE make U & reverse U 10x each, twist for wrist flex/extension 7x each, 7x each way (make C & make reverse C)  OT Exercise 9: rope pulley for:  bilateral UE shoulder flexion -  3 minutes, bilateral shoulder abduction with scaption  3 minutes ( for improving bilateral shoulder AROM & decrease muscle tightness)  OT Exercise 14: yellow theratubing on wall mount: using bilateral UE 2 sets of 5x (pull down for UE extension, biceps curls)    Limitations addressed: Strength, Coordination, Flexibility (rom)  Limitations addressed: rom , strength, coordination  Therapist provided: Verbal cuing  Progressed: Complexity of movement  Progressed: Begun theratubing exercises for bilateral UE strengthening.                     Patient Education:  OT instructed to do her HEP for UEs to her pain tolerance.       ASSESSMENT     Assessment:

## 2024-01-18 ENCOUNTER — HOSPITAL ENCOUNTER (OUTPATIENT)
Dept: OCCUPATIONAL THERAPY | Age: 24
Setting detail: THERAPIES SERIES
Discharge: HOME | End: 2024-01-18
Payer: MEDICAID

## 2024-01-18 PROCEDURE — 97140 MANUAL THERAPY 1/> REGIONS: CPT

## 2024-01-18 PROCEDURE — 97110 THERAPEUTIC EXERCISES: CPT

## 2024-01-18 ASSESSMENT — PAIN SCALES - GENERAL: PAINLEVEL_OUTOF10: 7

## 2024-01-18 ASSESSMENT — PAIN DESCRIPTION - ORIENTATION: ORIENTATION: LEFT;RIGHT

## 2024-01-18 ASSESSMENT — PAIN DESCRIPTION - PAIN TYPE: TYPE: ACUTE PAIN

## 2024-01-18 ASSESSMENT — PAIN DESCRIPTION - LOCATION: LOCATION: ARM;LEG;BACK;NECK

## 2024-01-18 NOTE — PROGRESS NOTES
[facility] [place] [facility]  [address]         Phone: [phone]       Fax: [phone]     Occupational Therapy Daily Treatment note     Occupational Therapy: Daily Note   Patient: Veronica Marie Fritsch (23 y.o. female)   Examination Date: 2024  No data recorded  No data recorded   :  2000 # of Visits since SOC:   28   MRN: [id number]  CSN: [id number] Start of Care Date: 2023   Insurance: Pay[place] / [place]: [employer] / Product Type: *No Product type* /   Insurance ID: [id number] - (Medicaid Managed) Secondary Insurance (if applicable):    Insurance Information: [employer]  (30 visits OT then pt will need authorization for futher visits).   Referring Physician: Josh Patel MD pt's new MD - Dr Leon Schaeffer   PCP: Valentín Prasad PA Visits to Date/Visits Approved:  (visit 6 in ) / 40    No Show/Cancelled Appts:   /       Medical Diagnosis: Pain in both wrists [M25.531, M25.532]  Complex regional pain syndrome type 1, affecting unspecified site [G90.50] pain in both wrists (M25.531, M25.532) ICD-10 code, complex regional pain syndrome type 1 affection unspecified site (G90.50) ICD-10 code  Treatment Diagnosis: bilateral UE & hand weakness,impaired coordination, impaired sensation, pain, stiffness( bilateral shoulders,elbow flexion,supination,wrist UD) & rt wrist extension (ICD-10 codes: M62.81, R27.9, R20.2, M79.621, M79.622, M79.631, M79.632, M79.641, M79.642)        SUBJECTIVE EXAMINATION   Pain Level: Pain Screening  Patient Currently in Pain: Yes  Pain Assessment: 0-10  Pain Level: 7  Post Treatment Pain Level: 7  Pain Type: Acute pain  Pain Location: Arm, Leg, Back, Neck  Pain Orientation: Left, Right  Pain Descriptors: Aching, Sore, Sharp, Spasm, Numbness, Crushing, Tingling, Tightness    Patient Comments: Subjective: Pt states she had to do land exercises

## 2024-01-22 ENCOUNTER — HOSPITAL ENCOUNTER (OUTPATIENT)
Dept: OCCUPATIONAL THERAPY | Age: 24
Setting detail: THERAPIES SERIES
Discharge: HOME | End: 2024-01-22
Payer: MEDICAID

## 2024-01-22 PROCEDURE — 97140 MANUAL THERAPY 1/> REGIONS: CPT

## 2024-01-22 PROCEDURE — 97110 THERAPEUTIC EXERCISES: CPT

## 2024-01-22 ASSESSMENT — PAIN DESCRIPTION - ORIENTATION: ORIENTATION: LEFT;RIGHT

## 2024-01-22 ASSESSMENT — PAIN DESCRIPTION - PAIN TYPE: TYPE: ACUTE PAIN

## 2024-01-22 ASSESSMENT — PAIN SCALES - GENERAL: PAINLEVEL_OUTOF10: 7

## 2024-01-22 NOTE — PROGRESS NOTES
7. Compared to eval: Increase lt hand strength:  by 15-20# & pinch (lateral, bauer, tip) by 4-5# so that pt will be able to open jars/bottles easier.                8. Compared to eval:Pt will report improved bilateral hand coordination for fine motor ADLs (button manipulation,cutting food). Pt will complete 9 hole peg test 20 seconds quicker than eval with rt hand/lt hand.                9. Increase lt UE strength: shoulder (flexion,abdcutor) to 3+/5, shoulder (IR,ER) to 4/5,elbow (flexor,extensor) to 4/5, forearm(supinator,pronator) to 4/5, wrist(flexor,extensor) to 4/5 so that pt can put more wt on her arm when getting out of chair, perform light housework, & light lift/carry.               10. Increase rt UE strength:shoulder (flexion,abdcutor) to 3+/5, shoulder (IR,ER) to 4/5,elbow (flexor,extensor) to 4/5, forearm(supinator,pronator) to 4/5, wrist(flexor,extensor) to 4/5 so that pt can put more wt on her arm when getting out of chair, perform light housework, & light lift/carry.                   TREATMENT PLAN   REQUIRES OT FOLLOW-UP: Yes  Type: Outpatient  Plan  Plan Frequency: 2x/week  Plan Weeks: 20 visits + recommend continue 20 visits after Dec 19, 2023.  Current Treatment Recommendations: Strengthening, ROM, Pain management, Coordination training, Patient/Caregiver education & training  Additional Comments: Continue OT       Therapy Time  Individual Time In: 0916  Individual Time Out: 1013  Minutes: 57  Timed Code Treatment Minutes: 57 Minutes       Electronically signed by Loreto Moore OT  on 1/22/2024 at 10:27 AM       Treatment Charges: Mins Units Time In/Out   [] Evaluation       []  Low       []  Moderate       []  High      []  Electrical Stim      []  Iontophoresis      []  Paraffin      []  Ultrasound        []  Masage      []  Neuromuscular Re-ed      []  ADL      [x]  Ther Exercise 41 3 09:16-09:26,09:42-10:13   []  Ther Activities      []  Neuro Re-Ed      []  Splinting

## 2024-01-26 ENCOUNTER — HOSPITAL ENCOUNTER (OUTPATIENT)
Dept: OCCUPATIONAL THERAPY | Age: 24
Setting detail: THERAPIES SERIES
Discharge: HOME | End: 2024-01-26
Payer: MEDICAID

## 2024-01-26 NOTE — PROGRESS NOTES
[facility]   OCCUPATIONAL THERAPY MISSED TREATMENT NOTE   OUTPATIENT   Date: 24  Patient Name: Veronica Marie Fritsch       MRN: [id number]   Account #: [id number]    : 2000  (23 y.o.)  Gender: female   Diagnosis: pain in both wrists (M25.531, M25.532) ICD-10 code, complex regional pain syndrome type 1 affection unspecified site (G90.50) ICD-10 code  OT Visit Information  Onset Date: 22  OT Insurance Information: [employer]  (30 visits OT then pt will need authorization for futher visits).  Total # of Visits Approved: 40  Canceled Appointment: 2         REASON FOR MISSED TREATMENT:  Pt cancel d/t getting the appt time wrong.  confirmed pt's next appt.             Electronically signed by Loreto Moore OT on 24 at 10:27 AM EST

## 2024-01-30 ENCOUNTER — HOSPITAL ENCOUNTER (OUTPATIENT)
Dept: OCCUPATIONAL THERAPY | Age: 24
Setting detail: THERAPIES SERIES
Discharge: HOME | End: 2024-01-30
Payer: MEDICAID

## 2024-01-30 PROCEDURE — 97140 MANUAL THERAPY 1/> REGIONS: CPT

## 2024-01-30 PROCEDURE — 97110 THERAPEUTIC EXERCISES: CPT

## 2024-01-30 ASSESSMENT — PAIN SCALES - GENERAL: PAINLEVEL_OUTOF10: 4

## 2024-01-30 ASSESSMENT — PAIN DESCRIPTION - ORIENTATION: ORIENTATION: LEFT;RIGHT

## 2024-01-30 ASSESSMENT — PAIN DESCRIPTION - PAIN TYPE: TYPE: ACUTE PAIN

## 2024-01-30 NOTE — PROGRESS NOTES
forearm supination by 10, & lt wrist extension by 10       4. Increase AROM rt UE by 10 (shoulder flexion & abduction, elbow flexion, forearm supination/pronation, wrist flexion/extension) & by 5 for wrist UD to improve functional motion for getting dressed & reaching into cabinets.       5. Increase AROM lt UE by 10 for shoulder flexion & abduction, forearm supination/pronation, & by 5 for wrist UD to improve functional motion for getting dressed & reaching into cabinets         6. Increase rt hand strength:  by 10# & pinch (lateral , bauer, tip) by 2# so that pt will be able to open jars/bottles easier.               7. Increase lt hand strength:  by 10# & pinch (lateral, bauer, tip) by 2# so that pt will be able to open jars/bottles easier.               8. Pt will report improved bilateral hand coordination for fine motor ADLs (button manipulation,cutting food). Pt will complete 9 hole peg test 10 seconds quicker than eval with rt hand/lt hand.               9. Pt will report increase UEFI total score by 10 points & report that doing ADLS & light IADLs are a little easier to do.                Long Term Goals Completed by 20 visits + 20 visits Current Status Goal Status   Compared to eval:Pt will report increase UEFI total score by 15-20 points & report that doing ADLS & light IADLs are a little easier to do.       2. Pt will report decrease in bilateral UE pain to 3 when she completes her daily activities and exercises.       3. Compared to eval :Decrease muscle tightness & Increase PROM bilateral UE shoulder (flexion, abduction) by 20       4. Compared to eval : Increase AROM by 20 rt shoulder flexion & abduction, & by 15 rt elbow to improve functional motion for getting dressed & reaching into cabinets.       5. Compared to eval: Increase AROM by  20 lt shoulder flexion & abduction to improve functional motion for getting dressed & reaching into cabinets.         6. Compared to eval: Increase rt

## 2024-02-01 ENCOUNTER — HOSPITAL ENCOUNTER (OUTPATIENT)
Dept: OCCUPATIONAL THERAPY | Age: 24
Setting detail: THERAPIES SERIES
Discharge: HOME | End: 2024-02-01
Payer: MEDICAID

## 2024-02-01 PROCEDURE — 97140 MANUAL THERAPY 1/> REGIONS: CPT

## 2024-02-01 PROCEDURE — 97110 THERAPEUTIC EXERCISES: CPT

## 2024-02-01 NOTE — FLOWSHEET NOTE
[] [facility]  Outpatient Rehabilitation &  Therapy  [address]  [phone]  F: [phone] [] [facility]  Outpatient Rehabilitation &  Therapy  [address]   [address]  P: [phone]  F: [phone] [] [facility]  Outpatient Rehabilitation &  Therapy  [address]  P: [phone]  F: [phone] [] [facility]  Outpatient Rehabilitation &  Therapy  [address]   P: [phone]  F: [phone] [x] [facility]   Outpatient Rehabilitation   & Therapy  [address]  P: [phone]   F: [phone]     Occupational Therapy Daily Treatment Note    Date:  2024  Patient Name:  Veronica Marie Fritsch    :  2000  MRN: [id number]  Physician: Referring Physician: Josh Patel MD pt's new MD - Dr Leon Schaeffer   PCP: Valentín Prasad PA     Insurance: [facility] [place] / Plan: [facility] [place] / Product Type: *No Product type* /   Insurance ID: [id number] - (Medicaid Managed)  Diagnosis: Medical Diagnosis: Pain in both wrists [M25.531, M25.532]  Complex regional pain syndrome type 1, affecting unspecified site [G90.50] pain in both wrists (M25.531, M25.532) ICD-10 code, complex regional pain syndrome type 1 affection unspecified site (G90.50) ICD-10 code  Treatment Diagnosis: bilateral UE & hand weakness,impaired coordination, impaired sensation, pain, stiffness( bilateral shoulders,elbow flexion,supination,wrist UD) & rt wrist extension (ICD-10 codes: M62.81, R27.9, R20.2, M79.621, M79.622, M79.631, M79.632, M79.641, M79.642)   Hina Bardaels Appt: unsure  Visit# / total visits: 30 (visit 8 in 2024) / 40     Cancels/No Shows: 0/0      Subjective:    Pain:  [x] Yes  [] No Location:  all over Pain Rating: (0-10 scale) 7/10  Pain altered Tx:  [] No  [x] Yes  Action: activity as tolerated  Pt Comments: Increased pain this date. Patient feels it is from PT session

## 2024-02-05 ENCOUNTER — HOSPITAL ENCOUNTER (OUTPATIENT)
Dept: OCCUPATIONAL THERAPY | Age: 24
Setting detail: THERAPIES SERIES
Discharge: HOME | End: 2024-02-05
Payer: MEDICAID

## 2024-02-05 PROCEDURE — 97110 THERAPEUTIC EXERCISES: CPT

## 2024-02-05 PROCEDURE — 97140 MANUAL THERAPY 1/> REGIONS: CPT

## 2024-02-05 ASSESSMENT — 9 HOLE PEG TEST
TEST_RESULT: FUNCTIONAL
TEST_RESULT: FUNCTIONAL
TESTTIME_SECONDS: 24
TESTTIME_SECONDS: 21

## 2024-02-05 ASSESSMENT — PAIN DESCRIPTION - PAIN TYPE: TYPE: ACUTE PAIN

## 2024-02-05 ASSESSMENT — PAIN DESCRIPTION - ORIENTATION: ORIENTATION: LEFT;RIGHT

## 2024-02-05 ASSESSMENT — PAIN SCALES - GENERAL: PAINLEVEL_OUTOF10: 7

## 2024-02-05 NOTE — PROGRESS NOTES
[facility]patient Rehabilitation and Therapy  Occupational Therapy  [address]    [place], [place] [address]  Phone: [phone]  Fax: [phone]     Occupational Therapy PROGRESS UPDATE Daily Treatment note     Occupational Therapy PROGRESS UPDATE : Daily Note   Patient: Veronica Marie Fritsch (24 y.o. female)   Examination Date: 2024  No data recorded  No data recorded   :  2000 # of Visits since SOC:   32   MRN: [id number]  CSN: [id number] Start of Care Date: 2023   Insurance: Payor: [employer] [place] / Plan: [employer] [place] / Product Type: *No Product type* /   Insurance ID: [id number] - (Medicaid Managed) Secondary Insurance (if applicable):    Insurance Information: [employer]  (30 visits OT then pt will need authorization for futher visits).   Referring Physician Dr Leon Schaeffer, copy to Dr Marybel Nava at [facility] per pt request.   PCP: Valentín Prasad PA Visits to Date/Visits Approved:  (10 visits in ) / 40    No Show/Cancelled Appts:   /       Medical Diagnosis: Pain in both wrists [M25.531, M25.532]  Complex regional pain syndrome type 1, affecting unspecified site [G90.50] pain in both wrists (M25.531, M25.532) ICD-10 code, complex regional pain syndrome type 1 affection unspecified site (G90.50) ICD-10 code  Treatment Diagnosis: bilateral UE & hand weakness,impaired coordination, impaired sensation, pain, stiffness( bilateral shoulders,elbow flexion,supination,wrist UD) & rt wrist extension (ICD-10 codes: M62.81, R27.9, R20.2, M79.621, M79.622, M79.631, M79.632, M79.641, M79.642)        SUBJECTIVE EXAMINATION   Pain Level: Pain Screening  Patient Currently in Pain: Yes  Pain Assessment: 0-10  Pain Level: 7 (Pt reports neck /back /shoulder pain is 4 and hands/elbows pain is 7 out 10.)  Post Treatment Pain Level: 7 (Pt reports neck /back /shoulder pain is 4 and hands/elbows pain is 7 out 10.)  Pain Type: Acute pain  Pain

## 2024-02-07 ENCOUNTER — APPOINTMENT (OUTPATIENT)
Dept: OCCUPATIONAL THERAPY | Age: 24
End: 2024-02-07
Payer: MEDICAID

## 2024-02-08 ENCOUNTER — HOSPITAL ENCOUNTER (OUTPATIENT)
Dept: OCCUPATIONAL THERAPY | Age: 24
Setting detail: THERAPIES SERIES
Discharge: HOME | End: 2024-02-08
Payer: MEDICAID

## 2024-02-08 PROCEDURE — 97110 THERAPEUTIC EXERCISES: CPT

## 2024-02-08 PROCEDURE — 97140 MANUAL THERAPY 1/> REGIONS: CPT

## 2024-02-08 ASSESSMENT — PAIN DESCRIPTION - PAIN TYPE: TYPE: ACUTE PAIN

## 2024-02-08 ASSESSMENT — PAIN SCALES - GENERAL: PAINLEVEL_OUTOF10: 7

## 2024-02-08 ASSESSMENT — PAIN DESCRIPTION - ORIENTATION: ORIENTATION: LEFT;RIGHT

## 2024-02-13 ENCOUNTER — HOSPITAL ENCOUNTER (OUTPATIENT)
Dept: OCCUPATIONAL THERAPY | Age: 24
Setting detail: THERAPIES SERIES
Discharge: HOME | End: 2024-02-13
Payer: MEDICAID

## 2024-02-13 PROCEDURE — 97140 MANUAL THERAPY 1/> REGIONS: CPT

## 2024-02-13 PROCEDURE — 97110 THERAPEUTIC EXERCISES: CPT

## 2024-02-13 NOTE — PROGRESS NOTES
[facility] Outpatient Rehabilitation and Therapy  Occupational Therapy  [address]    [place], [place] [address]  Phone: [phone]  Fax: [phone]     Occupational Therapy Daily Treatment note     Occupational Therapy: Daily Note   Patient: Veronica Marie Fritsch (24 y.o. female)   Examination Date: 2024  No data recorded  No data recorded   :  2000 # of Visits since SOC:   34   MRN: [id number]  CSN: [id number] Start of Care Date: 2023   Insurance: Payor: [employer] [place] / Plan: [employer] [place] / Product Type: *No Product type* /   Insurance ID: [id number] - (Medicaid Managed) Secondary Insurance (if applicable):    Insurance Information: [employer]  (30 visits OT then pt will need authorization for futher visits).   Referring Physician: Josh Patel MD Dr Alezander Escobar, copy to Dr Marybel Nava at [facility] per pt request.   PCP: Valentín Prasad PA Visits to Date/Visits Approved: 33 (12 visits in ) / 50    No Show/Cancelled Appts:   /       Medical Diagnosis: Pain in both wrists [M25.531, M25.532]  Complex regional pain syndrome type 1, affecting unspecified site [G90.50] pain in both wrists (M25.531, M25.532) ICD-10 code, complex regional pain syndrome type 1 affection unspecified site (G90.50) ICD-10 code  Treatment Diagnosis: bilateral UE & hand weakness,impaired coordination, impaired sensation, pain, stiffness( bilateral shoulders,elbow flexion,supination,wrist UD) & rt wrist extension (ICD-10 codes: M62.81, R27.9, R20.2, M79.621, M79.622, M79.631, M79.632, M79.641, M79.642)        SUBJECTIVE EXAMINATION   Pain Level: Pain Screening  Patient Currently in Pain: Yes  Pain Assessment: 0-10  Pain Level: 4  Post Treatment Pain Level: 4  Pain Type: Acute pain  Pain Location: Arm, Hand, Back, Leg, Neck  Pain Orientation: Left, Right  Pain Descriptors: Aching, Burning, Numbness, Tingling, Spasm, Crushing    Patient Comments:

## 2024-02-15 ENCOUNTER — HOSPITAL ENCOUNTER (OUTPATIENT)
Dept: OCCUPATIONAL THERAPY | Age: 24
Setting detail: THERAPIES SERIES
Discharge: HOME | End: 2024-02-15
Payer: MEDICAID

## 2024-02-15 PROCEDURE — 97110 THERAPEUTIC EXERCISES: CPT

## 2024-02-15 PROCEDURE — 97140 MANUAL THERAPY 1/> REGIONS: CPT

## 2024-02-15 NOTE — PROGRESS NOTES
Increase AROM rt UE by 10 (shoulder flexion & abduction, elbow flexion, forearm supination/pronation, wrist flexion/extension) & by 5 for wrist UD to improve functional motion for getting dressed & reaching into cabinets.       5. Increase AROM lt UE by 10 for shoulder flexion & abduction, forearm supination/pronation, & by 5 for wrist UD to improve functional motion for getting dressed & reaching into cabinets         6. Increase rt hand strength:  by 10# & pinch (lateral , bauer, tip) by 2# so that pt will be able to open jars/bottles easier.               7. Increase lt hand strength:  by 10# & pinch (lateral, bauer, tip) by 2# so that pt will be able to open jars/bottles easier.               8. Pt will report improved bilateral hand coordination for fine motor ADLs (button manipulation,cutting food). Pt will complete 9 hole peg test 10 seconds quicker than eval with rt hand/lt hand.               9. Pt will report increase UEFI total score by 10 points & report that doing ADLS & light IADLs are a little easier to do.                               Long Term Goals Completed by 20 visits + 20 visits + 20 visits Current Status Goal Status   Compared to eval:Pt will report increase UEFI total score by 15-20 points & report that doing ADLS & light IADLs are a little easier to do.       2. Pt will report decrease in bilateral UE pain to 3 when she completes her daily activities and exercises.       3. Compared to eval :Decrease muscle tightness & Increase PROM bilateral UE shoulder (flexion, abduction) by 20       4. Compared to eval : Increase AROM by 20 rt shoulder flexion & abduction, & by 15 rt elbow to improve functional motion for getting dressed & reaching into cabinets.       5. Compared to eval: Increase AROM by  20 lt shoulder flexion & abduction to improve functional motion for getting dressed & reaching into cabinets.         6. Compared to eval: Increase rt hand strength:  by 15-20# & pinch

## 2024-02-19 ENCOUNTER — HOSPITAL ENCOUNTER (OUTPATIENT)
Dept: OCCUPATIONAL THERAPY | Age: 24
Setting detail: THERAPIES SERIES
Discharge: HOME | End: 2024-02-19
Payer: MEDICAID

## 2024-02-19 PROCEDURE — 97140 MANUAL THERAPY 1/> REGIONS: CPT

## 2024-02-19 PROCEDURE — 97110 THERAPEUTIC EXERCISES: CPT

## 2024-02-19 ASSESSMENT — PAIN DESCRIPTION - LOCATION: LOCATION: ARM;LEG;BACK;NECK

## 2024-02-19 ASSESSMENT — PAIN DESCRIPTION - PAIN TYPE: TYPE: ACUTE PAIN

## 2024-02-19 ASSESSMENT — PAIN SCALES - GENERAL: PAINLEVEL_OUTOF10: 4

## 2024-02-19 ASSESSMENT — PAIN DESCRIPTION - ORIENTATION: ORIENTATION: LEFT;RIGHT

## 2024-02-19 NOTE — PROGRESS NOTES
[facility] Outpatient Rehabilitation and Therapy  Occupational Therapy  [address]    [place], [place] [address]  Phone: [phone]  Fax: [phone]     Occupational Therapy Daily Treatment note     Occupational Therapy: Daily Note   Patient: Veronica Marie Fritsch (24 y.o. female)   Examination Date: 2024  No data recorded  No data recorded   :  1998 # of Visits since SOC:   36   MRN: [id number]  CSN: [id number] Start of Care Date: 2023   Insurance: Payor: [employer] [place] / Plan: [employer] / Product Type: *No Product type* /   Insurance ID: [id number] - (Medicaid Managed) Secondary Insurance (if applicable):    Insurance Information: [employer]  (30 visits OT then pt will need authorization for futher visits).   Referring Physician: Josh Patel MD Dr Alezander Escobar, copy to Dr Marybel aNva at [facility] per pt request.   PCP: Valentín Prasad PA Visits to Date/Visits Approved: 34 (14 visits in ) / 50    No Show/Cancelled Appts:   /       Medical Diagnosis: Pain in both wrists [M25.531, M25.532]  Complex regional pain syndrome type 1, affecting unspecified site [G90.50] pain in both wrists (M25.531, M25.532) ICD-10 code, complex regional pain syndrome type 1 affection unspecified site (G90.50) ICD-10 code  Treatment Diagnosis: bilateral UE & hand weakness,impaired coordination, impaired sensation, pain, stiffness( bilateral shoulders,elbow flexion,supination,wrist UD) & rt wrist extension (ICD-10 codes: M62.81, R27.9, R20.2, M79.621, M79.622, M79.631, M79.632, M79.641, M79.642)        SUBJECTIVE EXAMINATION   Pain Level: Pain Screening  Patient Currently in Pain: Yes  Pain Assessment: 0-10  Pain Level: 4  Post Treatment Pain Level: 4  Pain Type: Acute pain  Pain Location: Arm, Leg, Back, Neck  Pain Orientation: Left, Right  Pain Descriptors: Aching, Burning, Numbness, Tingling, Crushing, Spasm    Patient Comments: Subjective: Pt

## 2024-02-27 ENCOUNTER — HOSPITAL ENCOUNTER (OUTPATIENT)
Dept: OCCUPATIONAL THERAPY | Age: 24
Setting detail: THERAPIES SERIES
Discharge: HOME | End: 2024-02-27
Payer: MEDICAID

## 2024-02-27 PROCEDURE — 97110 THERAPEUTIC EXERCISES: CPT

## 2024-02-27 ASSESSMENT — PAIN DESCRIPTION - ORIENTATION: ORIENTATION: RIGHT;LEFT

## 2024-02-27 ASSESSMENT — PAIN DESCRIPTION - LOCATION: LOCATION: ELBOW;ARM

## 2024-02-27 ASSESSMENT — PAIN DESCRIPTION - PAIN TYPE: TYPE: ACUTE PAIN

## 2024-02-27 ASSESSMENT — PAIN SCALES - GENERAL: PAINLEVEL_OUTOF10: 7

## 2024-02-27 NOTE — PROGRESS NOTES
Restrictions/Precautions: None     TREATMENT     Exercises:     Treatment Reasoning    OT Exercise 1: PROM/AAROM  bilateral UE (in all directions-flexion,abduction,diagonals to rt/lt sides) & hand (pt supine on mat) ,gentle soft tissue massage (upper trapezius, upper pectoral area) & scapula protraction/retraction gentle mobilization  (pt sitting edge of mat.)  OT Exercise 3: finger ladder forward reach: rt UE up/down 3 sets ht 23, lt UE up/down 3 sets ht  23  OT Exercise 6: yellow theraband flex bar - using bilateral hands rolling bar forward/back on table (from hands) 10x, bilateral UE make U & reverse U 10x each, twist for wrist flex/extension 10x each, 10x each way (make C & make reverse C)  OT Exercise 9: rope pulley for:  bilateral UE shoulder flexion -  3 minutes, bilateral shoulder abduction with scaption  3 minutes ( for improving bilateral shoulder AROM & decrease muscle tightness)  OT Exercise 13: blue & white  rom hoop  (ht  28\")on table: using rt UE move 12 rings from rt to lt side, using lt UE move 12rings from lt to rt side .  OT Exercise 14: yellow theratubing on wall mount: using bilateral UE 2 sets of  8x (pull down for UE extension, biceps curls, scapula pinches/pulls )  OT Exercise 15: key pegs - place 17  metal pegs in board then remove pegs & hold in hand  (using rt hand, using lt hand)    Limitations addressed: Strength, Coordination, Flexibility  Limitations addressed: rom , strength, coordination  Functional ability(s) targeted: Performing self care actvities  Functional ability(s) targeted: Recommended one-handed cutting board, specifically for the nails in the board to help stabilize food                     Patient Education: OT Education: Home Exercise Program, ADL Adaptive Strategies  Patient Education: Discussed use of one-handed cutting board to increase ease with cutting food       ASSESSMENT     Assessment: Assessment: OT treatment focused on decreasing pain and increasing BUE

## 2024-02-29 ENCOUNTER — HOSPITAL ENCOUNTER (OUTPATIENT)
Dept: OCCUPATIONAL THERAPY | Age: 24
Setting detail: THERAPIES SERIES
Discharge: HOME | End: 2024-02-29
Payer: MEDICAID

## 2024-02-29 PROCEDURE — 97110 THERAPEUTIC EXERCISES: CPT

## 2024-02-29 PROCEDURE — 97140 MANUAL THERAPY 1/> REGIONS: CPT

## 2024-02-29 ASSESSMENT — PAIN DESCRIPTION - PAIN TYPE: TYPE: ACUTE PAIN

## 2024-02-29 ASSESSMENT — PAIN SCALES - GENERAL: PAINLEVEL_OUTOF10: 7

## 2024-02-29 ASSESSMENT — PAIN DESCRIPTION - ORIENTATION: ORIENTATION: RIGHT;LEFT

## 2024-02-29 NOTE — PROGRESS NOTES
[facility] Outpatient Rehabilitation and Therapy  Occupational Therapy  [address]    [place], [place] [address]  Phone: [phone]  Fax: [phone]     Occupational Therapy Daily Treatment note     Occupational Therapy: Daily Note   Patient: Veronica Marie Fritsch (24 y.o. female)   Examination Date: 2024  No data recorded  No data recorded   :  1998 # of Visits since SOC:   38   MRN: [id number]  CSN: [id number] Start of Care Date: 2023   Insurance: Payor: [employer] [place] / Plan: [employer] / Product Type: *No Product type* /   Insurance ID: [id number] - (Medicaid Managed) Secondary Insurance (if applicable):    Insurance Information: [employer]  (30 visits OT then pt will need authorization for futher visits).   Referring Physician: Josh Patel MD [facility] pt request.   PCP: Valentín Prasad PA Visits to Date/Visits Approved: 36 (16 visits in ) / 50    No Show/Cancelled Appts:   /       Medical Diagnosis: Pain in both wrists [M25.531, M25.532]  Complex regional pain syndrome type 1, affecting unspecified site [G90.50] pain in both wrists (M25.531, M25.532) ICD-10 code, complex regional pain syndrome type 1 affection unspecified site (G90.50) ICD-10 code  Treatment Diagnosis: bilateral UE & hand weakness,impaired coordination, impaired sensation, pain, stiffness( bilateral shoulders,elbow flexion,supination,wrist UD) & rt wrist extension (ICD-10 codes: M62.81, R27.9, R20.2, M79.621, M79.622, M79.631, M79.632, M79.641, M79.642)        SUBJECTIVE EXAMINATION   Pain Level: Pain Screening  Patient Currently in Pain: Yes  Pain Assessment: 0-10  Pain Level: 7  Post Treatment Pain Level: 7  Pain Type: Acute pain  Pain Location: Neck, Arm, Leg, Back  Pain Orientation: Right, Left  Pain Descriptors: Sharp, Dull, Burning, Numbness, Tingling, Crushing    Patient Comments: Subjective: Pt reports pain all over her body.

## 2024-03-04 ENCOUNTER — HOSPITAL ENCOUNTER (OUTPATIENT)
Dept: OCCUPATIONAL THERAPY | Age: 24
Setting detail: THERAPIES SERIES
Discharge: HOME | End: 2024-03-04
Payer: MEDICAID

## 2024-03-04 PROCEDURE — 97140 MANUAL THERAPY 1/> REGIONS: CPT

## 2024-03-04 PROCEDURE — 97110 THERAPEUTIC EXERCISES: CPT

## 2024-03-04 ASSESSMENT — PAIN DESCRIPTION - PAIN TYPE: TYPE: ACUTE PAIN

## 2024-03-04 ASSESSMENT — PAIN SCALES - GENERAL: PAINLEVEL_OUTOF10: 8

## 2024-03-04 ASSESSMENT — PAIN DESCRIPTION - LOCATION: LOCATION: BACK;ARM

## 2024-03-04 ASSESSMENT — PAIN DESCRIPTION - ORIENTATION: ORIENTATION: LEFT;RIGHT

## 2024-03-04 NOTE — PROGRESS NOTES
bilateral UE HEP.       2. Pt will report decrease in bilateral UE pain to 5 when she completes her daily activities and exercises.       3. Decrease muscle tightness & Increase PROM bilateral UE shoulder (flexion, abduction) by 10, lt forearm supination by 10, & lt wrist extension by 10       4. Increase AROM rt UE by 10 (shoulder flexion & abduction, elbow flexion, forearm supination/pronation, wrist flexion/extension) & by 5 for wrist UD to improve functional motion for getting dressed & reaching into cabinets.       5. Increase AROM lt UE by 10 for shoulder flexion & abduction, forearm supination/pronation, & by 5 for wrist UD to improve functional motion for getting dressed & reaching into cabinets         6. Increase rt hand strength:  by 10# & pinch (lateral , bauer, tip) by 2# so that pt will be able to open jars/bottles easier.               7. Increase lt hand strength:  by 10# & pinch (lateral, bauer, tip) by 2# so that pt will be able to open jars/bottles easier.               8. Pt will report improved bilateral hand coordination for fine motor ADLs (button manipulation,cutting food). Pt will complete 9 hole peg test 10 seconds quicker than eval with rt hand/lt hand.               9. Pt will report increase UEFI total score by 10 points & report that doing ADLS & light IADLs are a little easier to do.                Long Term Goals Completed by 20 visits + 20 visits + 20 visits Current Status Goal Status   Compared to eval:Pt will report increase UEFI total score by 15-20 points & report that doing ADLS & light IADLs are a little easier to do.       2. Pt will report decrease in bilateral UE pain to 3 when she completes her daily activities and exercises.       3. Compared to eval :Decrease muscle tightness & Increase PROM bilateral UE shoulder (flexion, abduction) by 20       4. Compared to eval : Increase AROM by 20 rt shoulder flexion & abduction, & by 15 rt elbow to improve functional

## 2024-03-07 ENCOUNTER — HOSPITAL ENCOUNTER (OUTPATIENT)
Dept: OCCUPATIONAL THERAPY | Age: 24
Setting detail: THERAPIES SERIES
Discharge: HOME | End: 2024-03-07
Payer: MEDICAID

## 2024-03-07 PROCEDURE — 97110 THERAPEUTIC EXERCISES: CPT

## 2024-03-07 PROCEDURE — 97140 MANUAL THERAPY 1/> REGIONS: CPT

## 2024-03-07 ASSESSMENT — PAIN DESCRIPTION - PAIN TYPE: TYPE: ACUTE PAIN

## 2024-03-07 ASSESSMENT — PAIN DESCRIPTION - ORIENTATION: ORIENTATION: LEFT;RIGHT

## 2024-03-07 ASSESSMENT — PAIN SCALES - GENERAL: PAINLEVEL_OUTOF10: 7

## 2024-03-07 ASSESSMENT — PAIN DESCRIPTION - LOCATION: LOCATION: BACK;ARM;LEG

## 2024-03-07 NOTE — PROGRESS NOTES
abduction) by 10, lt forearm supination by 10, & lt wrist extension by 10       4. Increase AROM rt UE by 10 (shoulder flexion & abduction, elbow flexion, forearm supination/pronation, wrist flexion/extension) & by 5 for wrist UD to improve functional motion for getting dressed & reaching into cabinets.       5. Increase AROM lt UE by 10 for shoulder flexion & abduction, forearm supination/pronation, & by 5 for wrist UD to improve functional motion for getting dressed & reaching into cabinets         6. Increase rt hand strength:  by 10# & pinch (lateral , bauer, tip) by 2# so that pt will be able to open jars/bottles easier.               7. Increase lt hand strength:  by 10# & pinch (lateral, bauer, tip) by 2# so that pt will be able to open jars/bottles easier.               8. Pt will report improved bilateral hand coordination for fine motor ADLs (button manipulation,cutting food). Pt will complete 9 hole peg test 10 seconds quicker than eval with rt hand/lt hand.               9. Pt will report increase UEFI total score by 10 points & report that doing ADLS & light IADLs are a little easier to do.                Long Term Goals Completed by 20 visits + 20 visits + 20 visits Current Status Goal Status   Compared to eval:Pt will report increase UEFI total score by 15-20 points & report that doing ADLS & light IADLs are a little easier to do.       2. Pt will report decrease in bilateral UE pain to 3 when she completes her daily activities and exercises.       3. Compared to eval :Decrease muscle tightness & Increase PROM bilateral UE shoulder (flexion, abduction) by 20       4. Compared to eval : Increase AROM by 20 rt shoulder flexion & abduction, & by 15 rt elbow to improve functional motion for getting dressed & reaching into cabinets.       5. Compared to eval: Increase AROM by  20 lt shoulder flexion & abduction to improve functional motion for getting dressed & reaching into cabinets.         6.

## 2024-03-11 ENCOUNTER — HOSPITAL ENCOUNTER (OUTPATIENT)
Dept: OCCUPATIONAL THERAPY | Age: 24
Setting detail: THERAPIES SERIES
Discharge: HOME | End: 2024-03-11
Payer: MEDICAID

## 2024-03-11 PROCEDURE — 97140 MANUAL THERAPY 1/> REGIONS: CPT

## 2024-03-11 PROCEDURE — 97110 THERAPEUTIC EXERCISES: CPT

## 2024-03-11 ASSESSMENT — PAIN DESCRIPTION - ORIENTATION: ORIENTATION: LEFT;RIGHT

## 2024-03-11 ASSESSMENT — PAIN DESCRIPTION - PAIN TYPE: TYPE: ACUTE PAIN

## 2024-03-11 ASSESSMENT — PAIN DESCRIPTION - LOCATION: LOCATION: BACK;ARM;LEG

## 2024-03-11 ASSESSMENT — PAIN SCALES - GENERAL: PAINLEVEL_OUTOF10: 6

## 2024-03-11 NOTE — PROGRESS NOTES
functional motion for getting dressed & reaching into cabinets.         6. Compared to eval: Increase rt hand strength:  by 15-20# & pinch (lateral , bauer, tip) by 4-5 # so that pt will be able to open jars/bottles easier.                7. Compared to eval: Increase lt hand strength:  by 15-20# & pinch (lateral, bauer, tip) by 4-5# so that pt will be able to open jars/bottles easier.                8. Compared to eval:Pt will report improved bilateral hand coordination for fine motor ADLs (button manipulation,cutting food). Pt will complete 9 hole peg test 20 seconds quicker than eval with rt hand/lt hand.                9. Increase lt UE strength: shoulder (flexion,abdcutor) to 3+/5, shoulder (IR,ER) to 4/5,elbow (flexor,extensor) to 4/5, forearm(supinator,pronator) to 4/5, wrist(flexor,extensor) to 4/5 so that pt can put more wt on her arm when getting out of chair, perform light housework, & light lift/carry.               10. Increase rt UE strength:shoulder (flexion,abdcutor) to 3+/5, shoulder (IR,ER) to 4/5,elbow (flexor,extensor) to 4/5, forearm(supinator,pronator) to 4/5, wrist(flexor,extensor) to 4/5 so that pt can put more wt on her arm when getting out of chair, perform light housework, & light lift/carry.                   TREATMENT PLAN   REQUIRES OT FOLLOW-UP: Yes  Type: Outpatient  Plan  Plan Frequency: 2x/week  Plan Weeks: 20 visits +  20 visits after Dec 19, 2023 + recommend continue OT 20 more visits after Feb 5, 2024..  Current Treatment Recommendations: Strengthening, ROM, Pain management, Coordination training, Patient/Caregiver education & training  Additional Comments: Continue OT       Therapy Time     03/11/24 0918   OT Individual Minutes   Time In 0926   Time Out 1014   Minutes 48   OT Concurrent Minutes   Time In 0918   Time Out 0926   Minutes 8   Time Code Minutes    Timed Code Treatment Minutes 52 Minutes       Electronically signed by Loreto Moore OT  on 3/11/2024 at 10:38

## 2024-03-13 ENCOUNTER — HOSPITAL ENCOUNTER (OUTPATIENT)
Dept: OCCUPATIONAL THERAPY | Age: 24
Setting detail: THERAPIES SERIES
Discharge: HOME | End: 2024-03-13
Payer: MEDICAID

## 2024-03-13 PROCEDURE — 97110 THERAPEUTIC EXERCISES: CPT

## 2024-03-13 PROCEDURE — 97140 MANUAL THERAPY 1/> REGIONS: CPT

## 2024-03-13 ASSESSMENT — PAIN DESCRIPTION - PAIN TYPE: TYPE: ACUTE PAIN

## 2024-03-13 ASSESSMENT — PAIN SCALES - GENERAL: PAINLEVEL_OUTOF10: 6

## 2024-03-13 ASSESSMENT — PAIN DESCRIPTION - LOCATION: LOCATION: BACK;HIP;ARM;LEG

## 2024-03-13 ASSESSMENT — PAIN DESCRIPTION - ORIENTATION: ORIENTATION: RIGHT;LEFT

## 2024-03-13 NOTE — PROGRESS NOTES
[facility] Outpatient Rehabilitation and Therapy  Occupational Therapy  [address]    [place], [place] [address]  Phone: [phone]  Fax: [phone]     Occupational Therapy Daily Treatment note     Occupational Therapy: Daily Note   Patient: Veronica Marie Fritsch (24 y.o. female)   Examination Date: 2024  No data recorded  No data recorded   :  1998 # of Visits since SOC:   42   MRN: [id number]  CSN: [id number] Start of Care Date: 2024   Insurance: Payor: [employer] [place] / Plan: [employer] [place] / Product Type: *No Product type* /   Insurance ID: [id number] - (Medicaid Managed) Secondary Insurance (if applicable):    Insurance Information: [employer]  (30 visits OT then pt will need authorization for futher visits).   Referring Physician: Marybel Nava MD Dr Ashley Schneider at [facility] per pt request.   PCP: Valentín Prasad PA Visits to Date/Visits Approved: 40 (20 visits in ) / 50    No Show/Cancelled Appts:   /       Medical Diagnosis: Pain in both wrists [M25.531, M25.532]  Complex regional pain syndrome type 1, affecting unspecified site [G90.50] pain in both wrists (M25.531, M25.532) ICD-10 code, complex regional pain syndrome type 1 affection unspecified site (G90.50) ICD-10 code  Treatment Diagnosis: bilateral UE & hand weakness,impaired coordination, impaired sensation, pain, stiffness( bilateral shoulders,elbow flexion,supination,wrist UD) & rt wrist extension (ICD-10 codes: M62.81, R27.9, R20.2, M79.621, M79.622, M79.631, M79.632, M79.641, M79.642)        SUBJECTIVE EXAMINATION   Pain Level: Pain Screening  Patient Currently in Pain: Yes  Pain Assessment: 0-10  Pain Level: 6 (hip & back pain at \"6\", and bilateral UE /leg pain \"5\")  Post Treatment Pain Level: 6 (hip,back,elbow pain at \"6\", and bilateral UE /leg pain \"5\")  Pain Type: Acute pain  Pain Location: Back, Hip, Arm, Leg  Pain Orientation: Right, Left  Pain Descriptors:

## 2024-03-18 ENCOUNTER — HOSPITAL ENCOUNTER (OUTPATIENT)
Dept: OCCUPATIONAL THERAPY | Age: 24
Setting detail: THERAPIES SERIES
Discharge: HOME | End: 2024-03-18
Payer: MEDICAID

## 2024-03-18 PROCEDURE — 97110 THERAPEUTIC EXERCISES: CPT

## 2024-03-18 PROCEDURE — 97140 MANUAL THERAPY 1/> REGIONS: CPT

## 2024-03-18 ASSESSMENT — PAIN SCALES - GENERAL: PAINLEVEL_OUTOF10: 4

## 2024-03-18 ASSESSMENT — 9 HOLE PEG TEST
TESTTIME_SECONDS: 19
TEST_RESULT: FUNCTIONAL
TEST_RESULT: FUNCTIONAL
TESTTIME_SECONDS: 24

## 2024-03-18 ASSESSMENT — PAIN DESCRIPTION - ORIENTATION: ORIENTATION: LEFT;RIGHT

## 2024-03-18 ASSESSMENT — PAIN DESCRIPTION - PAIN TYPE: TYPE: ACUTE PAIN

## 2024-03-18 ASSESSMENT — PAIN DESCRIPTION - LOCATION: LOCATION: BACK;HIP;ARM;LEG

## 2024-03-18 NOTE — PROGRESS NOTES
Exercise 43 3 09:17-09:37,09:52-10:15   []  Ther Activities      []  Neuro Re-Ed      [x]Manual therapy  15 1 09:37-09:52   [x]  Measurements taken for Progress Update 10 0 09:07-09:17   Total  time 68 min           POC NOTE

## 2024-03-20 ENCOUNTER — HOSPITAL ENCOUNTER (OUTPATIENT)
Dept: OCCUPATIONAL THERAPY | Age: 24
Setting detail: THERAPIES SERIES
Discharge: HOME | End: 2024-03-20
Payer: MEDICAID

## 2024-03-20 PROCEDURE — 97110 THERAPEUTIC EXERCISES: CPT

## 2024-03-20 PROCEDURE — 97140 MANUAL THERAPY 1/> REGIONS: CPT

## 2024-03-20 ASSESSMENT — PAIN SCALES - GENERAL: PAINLEVEL_OUTOF10: 7

## 2024-03-20 ASSESSMENT — PAIN DESCRIPTION - PAIN TYPE: TYPE: ACUTE PAIN

## 2024-03-20 ASSESSMENT — PAIN DESCRIPTION - ORIENTATION: ORIENTATION: LEFT;RIGHT

## 2024-03-20 NOTE — PROGRESS NOTES
[facility] Outpatient Rehabilitation and Therapy  Occupational Therapy  [address]    [place], [place] [address]  Phone: [phone]  Fax: [phone]     Occupational Therapy Daily Treatment note     Occupational Therapy: Daily Note   Patient: Veronica Marie Fritsch (24 y.o. female)   Examination Date: 2024  No data recorded  No data recorded   :  1998 # of Visits since SOC:   44   MRN: [id number]  CSN: [id number] Start of Care Date: 2023   Insurance: Payor: [employer] [place] / Plan: [employer] [place] / Product Type: *No Product type* /   Insurance ID: [id number] - (Medicaid Managed) Secondary Insurance (if applicable):    Insurance Information: [employer]  (30 visits OT then pt will need authorization for futher visits).   Referring Physician: Marybel Nava MD Dr Ashley Schneider at [facility] per pt request.   PCP: Valentín Prasad PA Visits to Date/Visits Approved: 42 (22 visits in ) / 50    No Show/Cancelled Appts:   /       Medical Diagnosis: Pain in both wrists [M25.531, M25.532]  Complex regional pain syndrome type 1, affecting unspecified site [G90.50] pain in both wrists (M25.531, M25.532) ICD-10 code, complex regional pain syndrome type 1 affection unspecified site (G90.50) ICD-10 code  Treatment Diagnosis: bilateral UE & hand weakness,impaired coordination, impaired sensation, pain, stiffness( bilateral shoulders,elbow flexion,supination,wrist UD) & rt wrist extension (ICD-10 codes: M62.81, R27.9, R20.2, M79.621, M79.622, M79.631, M79.632, M79.641, M79.642)        SUBJECTIVE EXAMINATION   Pain Level: Pain Screening  Patient Currently in Pain: Yes  Pain Assessment: 0-10  Pain Level: 7  Post Treatment Pain Level: 7  Pain Type: Acute pain  Pain Location: Arm, Back, Hip, Generalized, Leg  Pain Orientation: Left, Right  Pain Descriptors: Burning, Dull, Crushing, Numbness, Tightness, Sharp    Patient Comments: Subjective: Pt states  had near

## 2024-03-25 ENCOUNTER — HOSPITAL ENCOUNTER (OUTPATIENT)
Dept: OCCUPATIONAL THERAPY | Age: 24
Setting detail: THERAPIES SERIES
Discharge: HOME | End: 2024-03-25
Payer: MEDICAID

## 2024-03-25 PROCEDURE — 97140 MANUAL THERAPY 1/> REGIONS: CPT

## 2024-03-25 PROCEDURE — 97110 THERAPEUTIC EXERCISES: CPT

## 2024-03-25 ASSESSMENT — PAIN DESCRIPTION - ORIENTATION: ORIENTATION: LEFT;RIGHT

## 2024-03-25 ASSESSMENT — PAIN DESCRIPTION - PAIN TYPE: TYPE: ACUTE PAIN

## 2024-03-25 ASSESSMENT — PAIN SCALES - GENERAL: PAINLEVEL_OUTOF10: 7

## 2024-03-25 NOTE — PROGRESS NOTES
curls.                     Patient Education:  No new exercises added to current program.        ASSESSMENT     Assessment: Assessment: Pt reports back/lt shoulder pain during shoulder PROM Today pt reports jaw pain.OT tx focus on PROM UE PROM to decrease muscle tightness. Pt completes wt bearing, rom, stretching, fine motor coordination, & strengthening exercises. .See OT exercises for details.Pt will benefit from continued skilled OT to provide manual therapy (21583 ) , therapeutic exercises (90906), therapeutic activities (74604 ) to facilitate bilateral UE rom, coordination, strength, desensitization, decrease pain,to improve pt's participation & independence for functional activities/light IADL.  Performance deficits / Impairments: Decreased ROM, Decreased strength, Decreased high-level IADLs, Decreased sensation, Decreased fine motor control    Post-Treatment Pain Level: 7 (jaw pain at \"7\" and whole body pain \"5\")    Prognosis: Good    Activity Tolerance: -- (Pt tolerates tx but has decrease  body pain from last visit.Pt now reports jaw pain.)             GOALS   Patient Goals   Patient goals : To decrease pain, increase bilateral motion & strength so she can do more of her daily activities.    Short Term Goals Completed by 10 visits Current Status Goal Status   Pt will demo & report modified independence with bilateral UE HEP.       2. Pt will report decrease in bilateral UE pain to 5 when she completes her daily activities and exercises.       3. Decrease muscle tightness & Increase PROM bilateral UE shoulder (flexion, abduction) by 10, lt forearm supination by 10, & lt wrist extension by 10       4. Increase AROM rt UE by 10 (shoulder flexion & abduction, elbow flexion, forearm supination/pronation, wrist flexion/extension) & by 5 for wrist UD to improve functional motion for getting dressed & reaching into cabinets.       5. Increase AROM lt UE by 10 for shoulder flexion & abduction, forearm

## 2024-03-28 ENCOUNTER — HOSPITAL ENCOUNTER (OUTPATIENT)
Dept: OCCUPATIONAL THERAPY | Age: 24
Setting detail: THERAPIES SERIES
Discharge: HOME | End: 2024-03-28
Payer: MEDICAID

## 2024-03-28 PROCEDURE — 97140 MANUAL THERAPY 1/> REGIONS: CPT

## 2024-03-28 PROCEDURE — 97110 THERAPEUTIC EXERCISES: CPT

## 2024-03-28 ASSESSMENT — PAIN DESCRIPTION - ORIENTATION: ORIENTATION: LEFT;RIGHT

## 2024-03-28 ASSESSMENT — PAIN SCALES - GENERAL: PAINLEVEL_OUTOF10: 7

## 2024-03-28 ASSESSMENT — PAIN DESCRIPTION - PAIN TYPE: TYPE: ACUTE PAIN

## 2024-03-28 NOTE — PROGRESS NOTES
[facility] Outpatient Rehabilitation and Therapy  Occupational Therapy  [address]    [place], [place] [address]  Phone: [phone]  Fax: [phone]     Occupational Therapy Daily Treatment note     Occupational Therapy: Daily Note   Patient: Veronica Marie Fritsch (24 y.o. female)   Examination Date: 2024  No data recorded  No data recorded   :  1998 # of Visits since SOC:   46   MRN: [id number]  CSN: [id number] Start of Care Date: 2023   Insurance: Payor: [employer] [place] / Plan: [employer] [place] / Product Type: *No Product type* /   Insurance ID: [id number] - (Medicaid Managed) Secondary Insurance (if applicable):    Insurance Information: [employer]  (30 visits OT then pt will need authorization for futher visits).   Referring Physician: Marybel Nava MD Dr Ashley Schneider at [facility] per pt request.   PCP: Valentín Prasad PA Visits to Date/Visits Approved: 44 (24 visits in ) / 50    No Show/Cancelled Appts:   /       Medical Diagnosis: Pain in both wrists [M25.531, M25.532]  Complex regional pain syndrome type 1, affecting unspecified site [G90.50] pain in both wrists (M25.531, M25.532) ICD-10 code, complex regional pain syndrome type 1 affection unspecified site (G90.50) ICD-10 code  Treatment Diagnosis: bilateral UE & hand weakness,impaired coordination, impaired sensation, pain, stiffness( bilateral shoulders,elbow flexion,supination,wrist UD) & rt wrist extension (ICD-10 codes: M62.81, R27.9, R20.2, M79.621, M79.622, M79.631, M79.632, M79.641, M79.642)        SUBJECTIVE EXAMINATION   Pain Level: Pain Screening  Patient Currently in Pain: Yes  Pain Assessment: 0-10  Pain Level: 7 (Pt reports pain 7 rt arm & wrist,pain 4 lt arm, back, legs.)  Post Treatment Pain Level: 7 (Pt reports pain 7 rt arm & wrist,pain 4 lt arm, back, legs.)  Pain Type: Acute pain  Pain Location: Arm, Back, Jaw, Leg  Pain Orientation: Left, Right  Pain

## 2024-04-01 ENCOUNTER — HOSPITAL ENCOUNTER (OUTPATIENT)
Dept: OCCUPATIONAL THERAPY | Age: 24
Setting detail: THERAPIES SERIES
Discharge: HOME | End: 2024-04-01
Payer: MEDICAID

## 2024-04-01 PROCEDURE — 97140 MANUAL THERAPY 1/> REGIONS: CPT

## 2024-04-01 PROCEDURE — 97110 THERAPEUTIC EXERCISES: CPT

## 2024-04-01 ASSESSMENT — PAIN DESCRIPTION - ORIENTATION: ORIENTATION: LEFT;RIGHT

## 2024-04-01 ASSESSMENT — PAIN DESCRIPTION - PAIN TYPE: TYPE: ACUTE PAIN

## 2024-04-01 ASSESSMENT — PAIN SCALES - GENERAL: PAINLEVEL_OUTOF10: 5

## 2024-04-01 NOTE — PROGRESS NOTES
[facility] Outpatient Rehabilitation and Therapy  Occupational Therapy  [address]    [place], [place] [address]  Phone: [phone]  Fax: [phone]     Occupational Therapy Daily Treatment note     Occupational Therapy: Daily Note   Patient: Veronica Marie Fritsch (24 y.o. female)   Examination Date: 2024  No data recorded  No data recorded   :  2000 # of Visits since SOC:   47   MRN: [id number]  CSN: [id number] Start of Care Date: 2023   Insurance: Pay[place] / Plan: [place] / Product Type: *No Product type* /   Insurance ID: [id number] - (Medicaid Managed) Secondary Insurance (if applicable):    Insurance Information: [employer]  (30 visits OT then pt will need authorization for futher visits).   Referring Physician: Marybel Nava MD Dr Ashley Schneider at [facility] per pt request.   PCP: Valentín Prasad PA Visits to Date/Visits Approved: 45 (25 visits in ) / 50    No Show/Cancelled Appts:   /       Medical Diagnosis: Pain in both wrists [M25.531, M25.532]  Complex regional pain syndrome type 1, affecting unspecified site [G90.50] pain in both wrists (M25.531, M25.532) ICD-10 code, complex regional pain syndrome type 1 affection unspecified site (G90.50) ICD-10 code  Treatment Diagnosis: bilateral UE & hand weakness,impaired coordination, impaired sensation, pain, stiffness( bilateral shoulders,elbow flexion,supination,wrist UD) & rt wrist extension (ICD-10 codes: M62.81, R27.9, R20.2, M79.621, M79.622, M79.631, M79.632, M79.641, M79.642)        SUBJECTIVE EXAMINATION   Pain Level: Pain Screening  Patient Currently in Pain: Yes  Pain Assessment: 0-10  Pain Level: 5  Post Treatment Pain Level: 5  Pain Type: Acute pain  Pain Location: Arm, Back, Jaw, Leg  Pain Orientation: Left, Right  Pain Descriptors: Burning, Crushing, Sharp, Dull, Tightness    Patient Comments: Subjective: Pt reports her pain all over is  5 on 0-10  Yes

## 2024-04-04 ENCOUNTER — OFFICE VISIT (OUTPATIENT)
Dept: PRIMARY CARE CLINIC | Age: 24
End: 2024-04-04
Payer: MEDICAID

## 2024-04-04 ENCOUNTER — HOSPITAL ENCOUNTER (OUTPATIENT)
Dept: OCCUPATIONAL THERAPY | Age: 24
Setting detail: THERAPIES SERIES
Discharge: HOME | End: 2024-04-04
Payer: MEDICAID

## 2024-04-04 VITALS — BODY MASS INDEX: 29.66 KG/M2 | HEART RATE: 90 BPM | HEIGHT: 62 IN | WEIGHT: 161.2 LBS | OXYGEN SATURATION: 99 %

## 2024-04-04 DIAGNOSIS — G90.513 COMPLEX REGIONAL PAIN SYNDROME TYPE 1 OF BOTH UPPER EXTREMITIES: ICD-10-CM

## 2024-04-04 DIAGNOSIS — D69.1 PLATELET FUNCTION DEFECT (HCC): Primary | ICD-10-CM

## 2024-04-04 LAB
BASOPHILS ABSOLUTE: 0.03 K/UL (ref 0–0.2)
BASOPHILS RELATIVE PERCENT: 0 % (ref 0–2)
EOSINOPHILS ABSOLUTE: 0.1 K/UL (ref 0–0.44)
EOSINOPHILS RELATIVE PERCENT: 1 % (ref 1–4)
HCT VFR BLD CALC: 40.4 % (ref 36.3–47.1)
HEMOGLOBIN: 13.4 G/DL (ref 11.9–15.1)
IMMATURE GRANULOCYTES %: 0 %
IMMATURE GRANULOCYTES ABSOLUTE: 0.03 K/UL (ref 0–0.3)
LYMPHOCYTES ABSOLUTE: 2.01 K/UL (ref 1.1–3.7)
LYMPHOCYTES RELATIVE PERCENT: 21 % (ref 24–43)
MCH RBC QN AUTO: 31.8 PG (ref 25.2–33.5)
MCHC RBC AUTO-ENTMCNC: 33.2 G/DL (ref 28.4–34.8)
MCV RBC AUTO: 95.7 FL (ref 82.6–102.9)
MONOCYTES ABSOLUTE: 0.93 K/UL (ref 0.1–1.2)
MONOCYTES RELATIVE PERCENT: 10 % (ref 3–12)
NEUTROPHILS ABSOLUTE: 6.62 K/UL (ref 1.5–8.1)
NEUTROPHILS RELATIVE PERCENT: 68 % (ref 36–65)
NRBC AUTOMATED: 0 PER 100 WBC
PDW BLD-RTO: 11.8 % (ref 11.8–14.4)
PLATELET # BLD: 271 K/UL (ref 138–453)
PMV BLD AUTO: 11 FL (ref 8.1–13.5)
RBC # BLD: 4.22 M/UL (ref 3.95–5.11)
WBC # BLD: 9.7 K/UL (ref 3.5–11.3)

## 2024-04-04 PROCEDURE — 99214 OFFICE O/P EST MOD 30 MIN: CPT | Performed by: PHYSICIAN ASSISTANT

## 2024-04-04 PROCEDURE — 97110 THERAPEUTIC EXERCISES: CPT

## 2024-04-04 PROCEDURE — 97140 MANUAL THERAPY 1/> REGIONS: CPT

## 2024-04-04 RX ORDER — CARBOXYMETHYLCELLULOSE SODIUM 5 MG/ML
SOLUTION/ DROPS OPHTHALMIC
COMMUNITY
Start: 2024-01-10

## 2024-04-04 ASSESSMENT — PAIN DESCRIPTION - PAIN TYPE: TYPE: ACUTE PAIN

## 2024-04-04 ASSESSMENT — PATIENT HEALTH QUESTIONNAIRE - PHQ9
SUM OF ALL RESPONSES TO PHQ QUESTIONS 1-9: 1
1. LITTLE INTEREST OR PLEASURE IN DOING THINGS: NOT AT ALL
SUM OF ALL RESPONSES TO PHQ QUESTIONS 1-9: 1
SUM OF ALL RESPONSES TO PHQ9 QUESTIONS 1 & 2: 1
2. FEELING DOWN, DEPRESSED OR HOPELESS: SEVERAL DAYS
SUM OF ALL RESPONSES TO PHQ QUESTIONS 1-9: 1
SUM OF ALL RESPONSES TO PHQ QUESTIONS 1-9: 1

## 2024-04-04 ASSESSMENT — ENCOUNTER SYMPTOMS
NAUSEA: 1
DIARRHEA: 0
COUGH: 0
ABDOMINAL DISTENTION: 0
ABDOMINAL PAIN: 0
CHEST TIGHTNESS: 0
SHORTNESS OF BREATH: 0
CONSTIPATION: 0

## 2024-04-04 ASSESSMENT — PAIN DESCRIPTION - ORIENTATION: ORIENTATION: RIGHT;LEFT

## 2024-04-04 ASSESSMENT — PAIN SCALES - GENERAL: PAINLEVEL_OUTOF10: 8

## 2024-04-04 NOTE — PROGRESS NOTES
health maintenance.  Instructed to continue current medications, diet and exercise.  Patient agreed with treatment plan. Follow up as directed.     Electronically signed by ATA Barakat on 4/4/2024 at 11:29 AM

## 2024-04-04 NOTE — FLOWSHEET NOTE
[facility] Outpatient Rehabilitation and Therapy  Occupational Therapy  [address]    [place], [place] [address]  Phone: [phone]  Fax: [phone]     Occupational Therapy Daily Treatment note     Occupational Therapy: Daily Note   Patient: Veronica Marie Fritsch (24 y.o. female)   Examination Date: 2024  No data recorded  No data recorded   :  2000 # of Visits since SOC:   48   MRN: [id number]  CSN: [id number] Start of Care Date: 2024   Insurance: Pay[place]: [place] / Plan: [place] / Product Type: *No Product type* /   Insurance ID: [id number] - (Medicaid Managed) Secondary Insurance (if applicable):    Insurance Information: [employer]  (30 visits OT then pt will need authorization for futher visits).   Referring Physician: Marybel Nava MD Dr Ashley Schneider at [facility] per pt request.   PCP: Valentín Prasad PA Visits to Date/Visits Approved: 46 (26 visits in ) /      No Show/Cancelled Appts:   /       Medical Diagnosis: Pain in both wrists [M25.531, M25.532]  Complex regional pain syndrome type 1, affecting unspecified site [G90.50] pain in both wrists (M25.531, M25.532) ICD-10 code, complex regional pain syndrome type 1 affection unspecified site (G90.50) ICD-10 code  Treatment Diagnosis: bilateral UE & hand weakness,impaired coordination, impaired sensation, pain, stiffness( bilateral shoulders,elbow flexion,supination,wrist UD) & rt wrist extension (ICD-10 codes: M62.81, R27.9, R20.2, M79.621, M79.622, M79.631, M79.632, M79.641, M79.642)        SUBJECTIVE EXAMINATION   Pain Level: Pain Screening  Patient Currently in Pain: Yes  Pain Assessment: 0-10  Pain Level: 8 (Pain 6 everywhere but armpits pain 8)  Post Treatment Pain Level: 7  Pain Type: Acute pain  Pain Location: Arm, Back, Jaw, Leg (armpits.)  Pain Orientation: Right, Left  Pain Descriptors: Burning, Crushing, Sharp, Dull, Tightness    Patient Comments:

## 2024-04-05 ENCOUNTER — APPOINTMENT (OUTPATIENT)
Dept: OCCUPATIONAL THERAPY | Age: 24
End: 2024-04-05
Payer: MEDICAID

## 2024-04-09 LAB — LYME ANTIBODY: 0.16

## 2024-04-11 ENCOUNTER — HOSPITAL ENCOUNTER (OUTPATIENT)
Dept: OCCUPATIONAL THERAPY | Age: 24
Setting detail: THERAPIES SERIES
Discharge: HOME | End: 2024-04-11
Payer: MEDICAID

## 2024-04-11 PROCEDURE — 97110 THERAPEUTIC EXERCISES: CPT

## 2024-04-11 PROCEDURE — 97140 MANUAL THERAPY 1/> REGIONS: CPT

## 2024-04-11 ASSESSMENT — PAIN DESCRIPTION - ORIENTATION: ORIENTATION: LEFT;RIGHT

## 2024-04-11 ASSESSMENT — PAIN DESCRIPTION - PAIN TYPE: TYPE: ACUTE PAIN

## 2024-04-11 ASSESSMENT — PAIN SCALES - GENERAL: PAINLEVEL_OUTOF10: 6

## 2024-04-11 NOTE — FLOWSHEET NOTE
[facility] Outpatient Rehabilitation and Therapy  Occupational Therapy  [address]    [place], [place] [address]  Phone: [phone]  Fax: [phone]     Occupational Therapy Daily Treatment note     Occupational Therapy: Daily Note   Patient: Veronica Marie Fritsch (24 y.o. female)   Examination Date: 2024  No data recorded  No data recorded   :  2000 # of Visits since SOC:   49   MRN: [id number]  CSN: [id number] Start of Care Date: 2023   Insurance: Pay[place]: [place] / Plan: [place] / Product Type: *No Product type* /   Insurance ID: [id number] - (Medicaid Managed) Secondary Insurance (if applicable):    Insurance Information: [employer]  (30 visits OT then pt will need authorization for futher visits).   Referring Physician: Marybel Nava MD Dr Ashley Schneider at [facility] per pt request.   PCP: Valentín Prasad PA Visits to Date/Visits Approved: 47 (27 visits in ) / 50    No Show/Cancelled Appts:   /       Medical Diagnosis: Pain in both wrists [M25.531, M25.532]  Complex regional pain syndrome type 1, affecting unspecified site [G90.50] pain in both wrists (M25.531, M25.532) ICD-10 code, complex regional pain syndrome type 1 affection unspecified site (G90.50) ICD-10 code  Treatment Diagnosis: bilateral UE & hand weakness,impaired coordination, impaired sensation, pain, stiffness( bilateral shoulders,elbow flexion,supination,wrist UD) & rt wrist extension (ICD-10 codes: M62.81, R27.9, R20.2, M79.621, M79.622, M79.631, M79.632, M79.641, M79.642)        SUBJECTIVE EXAMINATION   Pain Level: Pain Screening  Patient Currently in Pain: Yes  Pain Assessment: 0-10  Pain Level: 6  Post Treatment Pain Level: 6  Pain Type: Acute pain  Pain Location: Arm, Back, Jaw, Leg  Pain Orientation: Left, Right  Pain Descriptors: Burning, Crushing, Sharp, Dull, Tightness (constant)    Patient Comments: Subjective: Pt  reports her body is sore all

## 2024-04-16 ENCOUNTER — HOSPITAL ENCOUNTER (OUTPATIENT)
Dept: OCCUPATIONAL THERAPY | Age: 24
Setting detail: THERAPIES SERIES
Discharge: HOME | End: 2024-04-16
Payer: MEDICAID

## 2024-04-16 PROCEDURE — 97110 THERAPEUTIC EXERCISES: CPT

## 2024-04-16 PROCEDURE — 97140 MANUAL THERAPY 1/> REGIONS: CPT

## 2024-04-16 ASSESSMENT — PAIN DESCRIPTION - ORIENTATION: ORIENTATION: RIGHT;LEFT

## 2024-04-16 ASSESSMENT — PAIN SCALES - GENERAL: PAINLEVEL_OUTOF10: 6

## 2024-04-16 ASSESSMENT — PAIN DESCRIPTION - PAIN TYPE: TYPE: ACUTE PAIN

## 2024-04-16 NOTE — FLOWSHEET NOTE
[facility] Outpatient Rehabilitation and Therapy  Occupational Therapy  [address]    [place], [place] [address]  Phone: [phone]  Fax: [phone]     Occupational Therapy Daily Treatment note     Occupational Therapy: Daily Note   Patient: Veronica Marie Fritsch (24 y.o. female)   Examination Date: 2024  No data recorded  No data recorded   :  2000 # of Visits since SOC:   50   MRN: [id number]  CSN: [id number] Start of Care Date: 2023   Insurance: Pay[place]: [place] / Plan: [place] / Product Type: *No Product type* /   Insurance ID: [id number] - (Medicaid Managed) Secondary Insurance (if applicable):    Insurance Information: [employer] Medicaid  (30 visits OT then pt will need authorization for futher visits).   Referring Physician: Marybel Nava MD Dr Ashley Schneider at [facility] per pt request.   PCP: Valentín Prasad PA Visits to Date/Visits Approved: 48 (28 visits in ) / 50    No Show/Cancelled Appts:   /       Medical Diagnosis: Pain in both wrists [M25.531, M25.532]  Complex regional pain syndrome type 1, affecting unspecified site [G90.50] pain in both wrists (M25.531, M25.532) ICD-10 code, complex regional pain syndrome type 1 affection unspecified site (G90.50) ICD-10 code  Treatment Diagnosis: bilateral UE & hand weakness,impaired coordination, impaired sensation, pain, stiffness( bilateral shoulders,elbow flexion,supination,wrist UD) & rt wrist extension (ICD-10 codes: M62.81, R27.9, R20.2, M79.621, M79.622, M79.631, M79.632, M79.641, M79.642)        SUBJECTIVE EXAMINATION   Pain Level: Pain Screening  Patient Currently in Pain: Yes  Pain Assessment: 0-10  Pain Level: 6  Post Treatment Pain Level: 6  Pain Type: Acute pain  Pain Location: Arm, Back, Leg, Jaw  Pain Orientation: Right, Left  Pain Descriptors: Crushing, Dull, Sharp, Tightness, Burning (constant)    Patient Comments: Subjective: Pt states she had a good weekend

## 2024-04-18 ENCOUNTER — HOSPITAL ENCOUNTER (OUTPATIENT)
Dept: OCCUPATIONAL THERAPY | Age: 24
Setting detail: THERAPIES SERIES
Discharge: HOME | End: 2024-04-18
Payer: MEDICAID

## 2024-04-18 PROCEDURE — 97110 THERAPEUTIC EXERCISES: CPT

## 2024-04-18 PROCEDURE — 97140 MANUAL THERAPY 1/> REGIONS: CPT

## 2024-04-18 ASSESSMENT — PAIN DESCRIPTION - PAIN TYPE: TYPE: ACUTE PAIN

## 2024-04-18 ASSESSMENT — PAIN SCALES - GENERAL: PAINLEVEL_OUTOF10: 7

## 2024-04-18 ASSESSMENT — PAIN DESCRIPTION - ORIENTATION: ORIENTATION: LEFT;RIGHT

## 2024-04-18 NOTE — FLOWSHEET NOTE
[facility] Outpatient Rehabilitation and Therapy  Occupational Therapy  [address]    [place], [place] [address]  Phone: [phone]  Fax: [phone]     Occupational Therapy Daily Treatment note     Occupational Therapy: Daily Note   Patient: Veronica Marie Fritsch (24 y.o. female)   Examination Date: 2024  No data recorded  No data recorded   :  2000 # of Visits since SOC:   51   MRN: [id number]  CSN: [id number] Start of Care Date: 2023   Insurance: Pay[place]: [place] / [place]: [place] / Product Type: *No Product type* /   Insurance ID: [id number] - (Medicaid Managed) Secondary Insurance (if applicable):    Insurance Information: [employer] Medicaid  (30 visits OT then pt will need authorization for futher visits).   Referring Physician: Marybel Nava MD Dr Ashley Schneider at [facility] per pt request.   PCP: Valentín Prasad PA Visits to Date/Visits Approved: 49 (29 visits in ) / 50    No Show/Cancelled Appts:   /       Medical Diagnosis: Pain in both wrists [M25.531, M25.532]  Complex regional pain syndrome type 1, affecting unspecified site [G90.50] pain in both wrists (M25.531, M25.532) ICD-10 code, complex regional pain syndrome type 1 affection unspecified site (G90.50) ICD-10 code  Treatment Diagnosis: bilateral UE & hand weakness,impaired coordination, impaired sensation, pain, stiffness( bilateral shoulders,elbow flexion,supination,wrist UD) & rt wrist extension (ICD-10 codes: M62.81, R27.9, R20.2, M79.621, M79.622, M79.631, M79.632, M79.641, M79.642)        SUBJECTIVE EXAMINATION   Pain Level: Pain Screening  Patient Currently in Pain: Yes  Pain Assessment: 0-10  Pain Level: 7  Post Treatment Pain Level: 7  Pain Type: Acute pain  Pain Location: Arm, Back, Jaw, Leg  Pain Orientation: Left, Right  Pain Descriptors: Crushing, Dull, Sharp, Burning, Numbness (constant. Pt states that when she gets numbness its in her fingers &

## 2024-04-22 ENCOUNTER — HOSPITAL ENCOUNTER (OUTPATIENT)
Dept: OCCUPATIONAL THERAPY | Age: 24
Setting detail: THERAPIES SERIES
Discharge: HOME | End: 2024-04-22
Payer: MEDICAID

## 2024-04-22 PROCEDURE — 97110 THERAPEUTIC EXERCISES: CPT

## 2024-04-22 PROCEDURE — 97140 MANUAL THERAPY 1/> REGIONS: CPT

## 2024-04-22 PROCEDURE — 97168 OT RE-EVAL EST PLAN CARE: CPT

## 2024-04-22 ASSESSMENT — 9 HOLE PEG TEST
TEST_RESULT: FUNCTIONAL
TESTTIME_SECONDS: 17
TEST_RESULT: FUNCTIONAL
TESTTIME_SECONDS: 21

## 2024-04-22 ASSESSMENT — PAIN DESCRIPTION - ORIENTATION: ORIENTATION: RIGHT;LEFT

## 2024-04-22 ASSESSMENT — PAIN SCALES - GENERAL: PAINLEVEL_OUTOF10: 7

## 2024-04-22 ASSESSMENT — PAIN DESCRIPTION - PAIN TYPE: TYPE: ACUTE PAIN

## 2024-04-22 NOTE — PROGRESS NOTES
will complete 9 hole peg test 10 seconds quicker than eval with rt hand/lt hand.   STG 8 Current Status: Goal Met button manipulation & cutting food.9 hole peg test quicker by : 25 seconds lt hand, 26 seconds rt hand.   STG Goal 8 Status: Met   Short Term Goal 9 Pt will report increase UEFI total score by 10 points & report that doing ADLS & light IADLs are a little easier to do.   STG 9 Current Status: UEFI total score 35 (Increase by 14 compared to eval.)   STG Goal 9 Status: Met   Long Term Goals   Time Frame for Long Term Goals  20 visits + 20 visits + 20 visits   Long Term Goal 1 Compared to eval:Pt will report increase UEFI total score by 15-20 points & report that doing ADLS & light IADLs are a little easier to do.   LTG 1 Current Status: UEFI  total score 35 (Increase by 14 compared to eval.)   LTG Goal 1 Status: Partially met;In progress   Long Term Goal 2 Pt will report decrease in bilateral UE pain to 3 when she completes her daily activities and exercises.   LTG 2 Current Status: Pain is 6 arms.   LTG Goal 2 Status: Partially met;In progress   Long Term Goal 3 Compared to eval :Decrease muscle tightness & Increase PROM bilateral UE shoulder (flexion, abduction) by 20   LTG 3 Current Status: Bilateral shoulder PROM -wfls   LTG Goal 3 Status: Met   Long Term Goal 4 Compared to eval : Increase AROM by 20 rt shoulder flexion & abduction, & by 15 rt elbow to improve functional motion for getting dressed & reaching into cabinets.   LTG 4 Current Status: Goal Met rt UE all motions (compared to eval increase shoulder: flexion by 55,abduction by 85)   LTG Goal 4 Status: Met   Long Term Goal 5 Compared to eval: Increase AROM by  20 lt shoulder flexion & abduction to improve functional motion for getting dressed & reaching into cabinets.   LTG 5 Current Status: Goal Met lt UE  all motions (compared to eval increase shoulder : flexion by 43 & abduction by 80)   LTG Goal 5 Status: Met   Long Term Goal 6 Compared to

## 2024-04-29 ENCOUNTER — HOSPITAL ENCOUNTER (OUTPATIENT)
Dept: OCCUPATIONAL THERAPY | Age: 24
Setting detail: THERAPIES SERIES
Discharge: HOME | End: 2024-04-29
Payer: MEDICAID

## 2024-04-29 PROCEDURE — 97140 MANUAL THERAPY 1/> REGIONS: CPT

## 2024-04-29 PROCEDURE — 97110 THERAPEUTIC EXERCISES: CPT

## 2024-04-29 PROCEDURE — 97530 THERAPEUTIC ACTIVITIES: CPT

## 2024-04-29 ASSESSMENT — PAIN DESCRIPTION - PAIN TYPE: TYPE: ACUTE PAIN

## 2024-04-29 ASSESSMENT — PAIN DESCRIPTION - ORIENTATION: ORIENTATION: LEFT;RIGHT

## 2024-04-29 ASSESSMENT — PAIN SCALES - GENERAL: PAINLEVEL_OUTOF10: 6

## 2024-04-29 NOTE — FLOWSHEET NOTE
increase UEFI total score by 10 points & report that doing ADLS & light IADLs are a little easier to do.    4/22/2024Goal Met UEFI total score 35 (Increase by 14 compared to eval.)                         Long Term Goals Completed by 20 visits + 20 visits + 20 visits + 30 visits approved (4/23/24-6/23/2024) Current Status Goal Status   Compared to eval:Pt will report increase UEFI total score by 15-20 points & report that doing ADLS & light IADLs are a little easier to do.       2. Pt will report decrease in bilateral UE pain to 3 when she completes her daily activities and exercises.       3. Compared to eval :Decrease muscle tightness & Increase PROM bilateral UE shoulder (flexion, abduction) by 20       4. Compared to eval : Increase AROM by 20 rt shoulder flexion & abduction, & by 15 rt elbow to improve functional motion for getting dressed & reaching into cabinets.       5. Compared to eval: Increase AROM by  20 lt shoulder flexion & abduction to improve functional motion for getting dressed & reaching into cabinets. 4/22/2024Goal Met lt UE  all motions (compared to eval increase shoulder : flexion by 43 & abduction by 80)       6. Compared to eval: Increase rt hand strength:  by 15-20# & pinch (lateral , bauer, tip) by 4-5 # so that pt will be able to open jars/bottles easier.                7. Compared to eval: Increase lt hand strength:  by 15-20# & pinch (lateral, bauer, tip) by 4-5# so that pt will be able to open jars/bottles easier.                8. Compared to eval:Pt will report improved bilateral hand coordination for fine motor ADLs (button manipulation,cutting food). Pt will complete 9 hole peg test 20 seconds quicker than eval with rt hand/lt hand.                9. Increase lt UE strength: shoulder (flexion,abdcutor) to 3+/5, shoulder (IR,ER) to 4/5,elbow (flexor,extensor) to 4/5, forearm(supinator,pronator) to 4/5, wrist(flexor,extensor) to 4/5 so that pt can put more wt on her arm

## 2024-05-02 ENCOUNTER — HOSPITAL ENCOUNTER (OUTPATIENT)
Dept: OCCUPATIONAL THERAPY | Age: 24
Setting detail: THERAPIES SERIES
Discharge: HOME | End: 2024-05-02
Payer: MEDICAID

## 2024-05-02 PROCEDURE — 97140 MANUAL THERAPY 1/> REGIONS: CPT

## 2024-05-02 PROCEDURE — 97110 THERAPEUTIC EXERCISES: CPT

## 2024-05-02 ASSESSMENT — PAIN DESCRIPTION - ORIENTATION: ORIENTATION: LEFT;RIGHT

## 2024-05-02 ASSESSMENT — PAIN DESCRIPTION - PAIN TYPE: TYPE: ACUTE PAIN

## 2024-05-02 ASSESSMENT — PAIN SCALES - GENERAL: PAINLEVEL_OUTOF10: 7

## 2024-05-02 NOTE — FLOWSHEET NOTE
[facility] Outpatient Rehabilitation and Therapy  Occupational Therapy  [address]    [place], [place] [address]  Phone: [phone]  Fax: [phone]     Occupational Therapy Daily Treatment note     Occupational Therapy: Daily Note   Patient: Veronica Marie Fritsch (24 y.o. female)   Examination Date: 2024  No data recorded  No data recorded   :  2000 # of Visits since SOC:   54   MRN: [id number]  CSN: [id number] Start of Care Date: 2023   Insurance: Payor: [employer] [place] / Plan: [employer] / Product Type: *No Product type* /   Insurance ID: [id number] - (Medicaid Managed) Secondary Insurance (if applicable):    Insurance Information: [employer]  (30 visits OT then pt will need authorization for futher visits). Approved visits from  (therapeutic exercises(84891) 60 units,manual therapy (28457) 40 units,therapeutic exercises, therapeutic activity (88361) 40 units).   Referring Physician: Marybel Nava MD Dr Ashley Schneider at [facility] per pt request.   PCP: Valentín Prasad PA Visits to Date/Visits Approved: 52 (32 visits in ) / 70    No Show/Cancelled Appts:   /       Medical Diagnosis: Pain in both wrists [M25.531, M25.532]  Complex regional pain syndrome type 1, affecting unspecified site [G90.50] pain in both wrists (M25.531, M25.532) ICD-10 code, complex regional pain syndrome type 1 affection unspecified site (G90.50) ICD-10 code  Treatment Diagnosis: bilateral UE & hand weakness,impaired coordination, impaired sensation, pain, stiffness( bilateral shoulders,elbow flexion,supination,wrist UD) & rt wrist extension (ICD-10 codes: M62.81, R27.9, R20.2, M79.621, M79.622, M79.631, M79.632, M79.641, M79.642)        SUBJECTIVE EXAMINATION   Pain Level: Pain Screening  Patient Currently in Pain: Yes  Pain Assessment: 0-10  Pain Level: 7 (Bilateral elbow pain \"7\", bilateral arm except elbows, legs, jaw, back, neck pain is

## 2024-05-06 ENCOUNTER — HOSPITAL ENCOUNTER (OUTPATIENT)
Dept: OCCUPATIONAL THERAPY | Age: 24
Setting detail: THERAPIES SERIES
Discharge: HOME | End: 2024-05-06
Payer: MEDICAID

## 2024-05-06 PROCEDURE — 97110 THERAPEUTIC EXERCISES: CPT

## 2024-05-06 PROCEDURE — 97140 MANUAL THERAPY 1/> REGIONS: CPT

## 2024-05-06 ASSESSMENT — PAIN DESCRIPTION - PAIN TYPE: TYPE: ACUTE PAIN

## 2024-05-06 ASSESSMENT — PAIN DESCRIPTION - ORIENTATION: ORIENTATION: LEFT;RIGHT

## 2024-05-06 ASSESSMENT — PAIN SCALES - GENERAL: PAINLEVEL_OUTOF10: 7

## 2024-05-06 NOTE — FLOWSHEET NOTE
cabinets.       5. Increase AROM lt UE by 10 for shoulder flexion & abduction, forearm supination/pronation, & by 5 for wrist UD to improve functional motion for getting dressed & reaching into cabinets         6. Increase rt hand strength:  by 10# & pinch (lateral , bauer, tip) by 2# so that pt will be able to open jars/bottles easier.               7. Increase lt hand strength:  by 10# & pinch (lateral, bauer, tip) by 2# so that pt will be able to open jars/bottles easier.               8. Pt will report improved bilateral hand coordination for fine motor ADLs (button manipulation,cutting food). Pt will complete 9 hole peg test 10 seconds quicker than eval with rt hand/lt hand.               9. Pt will report increase UEFI total score by 10 points & report that doing ADLS & light IADLs are a little easier to do.                Long Term Goals Completed by 20 visits + 20 visits + 20 visits + 30 visits approved (4/23/24-6/23/2024) Current Status Goal Status   Compared to eval:Pt will report increase UEFI total score by 15-20 points & report that doing ADLS & light IADLs are a little easier to do.       2. Pt will report decrease in bilateral UE pain to 3 when she completes her daily activities and exercises.       3. Compared to eval :Decrease muscle tightness & Increase PROM bilateral UE shoulder (flexion, abduction) by 20       4. Compared to eval : Increase AROM by 20 rt shoulder flexion & abduction, & by 15 rt elbow to improve functional motion for getting dressed & reaching into cabinets.       5. Compared to eval: Increase AROM by  20 lt shoulder flexion & abduction to improve functional motion for getting dressed & reaching into cabinets.         6. Compared to eval: Increase rt hand strength:  by 15-20# & pinch (lateral , bauer, tip) by 4-5 # so that pt will be able to open jars/bottles easier.                7. Compared to eval: Increase lt hand strength:  by 15-20# & pinch (lateral,

## 2024-05-08 ENCOUNTER — TELEPHONE (OUTPATIENT)
Dept: PRIMARY CARE CLINIC | Age: 24
End: 2024-05-08

## 2024-05-08 NOTE — TELEPHONE ENCOUNTER
Pt will be flying on May 26th and on June 9th. Asking for a letter stating that she needs to wear her Flex Doctor Machine, which is  basically an advanced 10 units.  Pt got the machine through [facility].

## 2024-05-09 ENCOUNTER — HOSPITAL ENCOUNTER (OUTPATIENT)
Dept: OCCUPATIONAL THERAPY | Age: 24
Setting detail: THERAPIES SERIES
Discharge: HOME | End: 2024-05-09
Payer: MEDICAID

## 2024-05-09 NOTE — FLOWSHEET NOTE
[facility] Outpatient Rehabilitation and Therapy  Occupational Therapy  [address]    [place], [place] [address]  Phone: [phone]  Fax: [phone]  [facility]   OCCUPATIONAL THERAPY MISSED TREATMENT NOTE   OUTPATIENT   Date: 24  Patient Name: Veronica Marie Fritsch      : 2000  (24 y.o.)  Gender: female   Diagnosis: pain in both wrists (M25.531, M25.532) ICD-10 code, complex regional pain syndrome type 1 affection unspecified site (G90.50) ICD-10 code  OT Visit Information  Onset Date: 22  OT Insurance Information: [employer] Medicaid  (30 visits OT then pt will need authorization for futher visits). Approved visits from  (therapeutic exercises(98190) 60 units,manual therapy (21005) 40 units,therapeutic exercises, therapeutic activity (13213) 40 units).  Total # of Visits Approved: 70  Canceled Appointment: 3         REASON FOR MISSED TREATMENT:     -        Patient cancelled due to illness.Pt cancel d/t not feeling well today.  scheduled 2 more appts for pt.         Electronically signed by Loreto Moore OT on 24 at 9:53 AM EDT

## 2024-05-13 ENCOUNTER — HOSPITAL ENCOUNTER (OUTPATIENT)
Dept: OCCUPATIONAL THERAPY | Age: 24
Setting detail: THERAPIES SERIES
Discharge: HOME | End: 2024-05-13
Payer: MEDICAID

## 2024-05-13 PROCEDURE — 97110 THERAPEUTIC EXERCISES: CPT

## 2024-05-13 PROCEDURE — 97140 MANUAL THERAPY 1/> REGIONS: CPT

## 2024-05-13 ASSESSMENT — PAIN DESCRIPTION - ORIENTATION: ORIENTATION: LEFT;RIGHT

## 2024-05-13 ASSESSMENT — PAIN DESCRIPTION - PAIN TYPE: TYPE: ACUTE PAIN

## 2024-05-13 ASSESSMENT — PAIN SCALES - GENERAL: PAINLEVEL_OUTOF10: 6

## 2024-05-13 NOTE — FLOWSHEET NOTE
[facility] Outpatient Rehabilitation and Therapy  Occupational Therapy  [address]    [place], [place] [address]  Phone: [phone]  Fax: [phone]     Occupational Therapy Daily Treatment note     Occupational Therapy: Daily Note   Patient: Veronica Marie Fritsch (24 y.o. female)   Examination Date: 2024  No data recorded  No data recorded   :  2000 # of Visits since SOC:   56   MRN: [id number]  CSN: [id number] Start of Care Date: 2023   Insurance: Payor: [employer] [place] / Plan: [employer] [place]ICA[place] / Product Type: *No Product type* /   Insurance ID: [id number] - (Medicaid Managed) Secondary Insurance (if applicable):    Insurance Information: [employer] Medicaid  (30 visits OT then pt will need authorization for futher visits). Approved visits from  (therapeutic exercises(22226) 60 units,manual therapy (66780) 40 units,therapeutic exercises, therapeutic activity (54926) 40 units).   Referring Physician: Marybel Nava MD Dr Ashley Schneider at [facility] per pt request.   PCP: Valentín Prasad PA Visits to Date/Visits Approved: 54 (34 visits in ) / 70    No Show/Cancelled Appts:   /       Medical Diagnosis: Pain in both wrists [M25.531, M25.532]  Complex regional pain syndrome type 1, affecting unspecified site [G90.50] pain in both wrists (M25.531, M25.532) ICD-10 code, complex regional pain syndrome type 1 affection unspecified site (G90.50) ICD-10 code  Treatment Diagnosis: bilateral UE & hand weakness,impaired coordination, impaired sensation, pain, stiffness( bilateral shoulders,elbow flexion,supination,wrist UD) & rt wrist extension (ICD-10 codes: M62.81, R27.9, R20.2, M79.621, M79.622, M79.631, M79.632, M79.641, M79.642)        SUBJECTIVE EXAMINATION   Pain Level: Pain Screening  Patient Currently in Pain: Yes  Pain Assessment: 0-10  Pain Level: 6  Post Treatment Pain Level: 6  Pain Type: Acute pain  Pain Location: Arm, Back,

## 2024-05-16 ENCOUNTER — HOSPITAL ENCOUNTER (OUTPATIENT)
Dept: OCCUPATIONAL THERAPY | Age: 24
Setting detail: THERAPIES SERIES
Discharge: HOME | End: 2024-05-16
Payer: MEDICAID

## 2024-05-16 PROCEDURE — 97110 THERAPEUTIC EXERCISES: CPT

## 2024-05-16 PROCEDURE — 97140 MANUAL THERAPY 1/> REGIONS: CPT

## 2024-05-16 ASSESSMENT — PAIN SCALES - GENERAL: PAINLEVEL_OUTOF10: 6

## 2024-05-16 ASSESSMENT — PAIN DESCRIPTION - PAIN TYPE: TYPE: ACUTE PAIN

## 2024-05-16 ASSESSMENT — PAIN DESCRIPTION - ORIENTATION: ORIENTATION: LEFT;RIGHT

## 2024-05-16 NOTE — FLOWSHEET NOTE
report modified independence with bilateral UE HEP.       2. Pt will report decrease in bilateral UE pain to 5 when she completes her daily activities and exercises.       3. Decrease muscle tightness & Increase PROM bilateral UE shoulder (flexion, abduction) by 10, lt forearm supination by 10, & lt wrist extension by 10       4. Increase AROM rt UE by 10 (shoulder flexion & abduction, elbow flexion, forearm supination/pronation, wrist flexion/extension) & by 5 for wrist UD to improve functional motion for getting dressed & reaching into cabinets.       5. Increase AROM lt UE by 10 for shoulder flexion & abduction, forearm supination/pronation, & by 5 for wrist UD to improve functional motion for getting dressed & reaching into cabinets         6. Increase rt hand strength:  by 10# & pinch (lateral , bauer, tip) by 2# so that pt will be able to open jars/bottles easier.               7. Increase lt hand strength:  by 10# & pinch (lateral, bauer, tip) by 2# so that pt will be able to open jars/bottles easier.               8. Pt will report improved bilateral hand coordination for fine motor ADLs (button manipulation,cutting food). Pt will complete 9 hole peg test 10 seconds quicker than eval with rt hand/lt hand.               9. Pt will report increase UEFI total score by 10 points & report that doing ADLS & light IADLs are a little easier to do.                 Long Term Goals Completed by 20 visits + 20 visits + 20 visits + 30 visits approved (4/23/24-6/23/2024) Current Status Goal Status   Compared to eval:Pt will report increase UEFI total score by 15-20 points & report that doing ADLS & light IADLs are a little easier to do.       2. Pt will report decrease in bilateral UE pain to 3 when she completes her daily activities and exercises.       3. Compared to eval :Decrease muscle tightness & Increase PROM bilateral UE shoulder (flexion, abduction) by 20       4. Compared to eval : Increase AROM by 20

## 2024-05-20 ENCOUNTER — HOSPITAL ENCOUNTER (OUTPATIENT)
Dept: OCCUPATIONAL THERAPY | Age: 24
Setting detail: THERAPIES SERIES
Discharge: HOME | End: 2024-05-20
Payer: MEDICAID

## 2024-05-20 PROCEDURE — 97140 MANUAL THERAPY 1/> REGIONS: CPT

## 2024-05-20 PROCEDURE — 97110 THERAPEUTIC EXERCISES: CPT

## 2024-05-20 ASSESSMENT — PAIN DESCRIPTION - PAIN TYPE: TYPE: ACUTE PAIN

## 2024-05-20 ASSESSMENT — PAIN SCALES - GENERAL: PAINLEVEL_OUTOF10: 7

## 2024-05-20 ASSESSMENT — PAIN DESCRIPTION - ORIENTATION: ORIENTATION: LEFT;RIGHT

## 2024-05-20 NOTE — FLOWSHEET NOTE
[facility] Outpatient Rehabilitation and Therapy  Occupational Therapy  [address]    [place], [place] [address]  Phone: [phone]  Fax: [phone]     Occupational Therapy Daily Treatment note     Occupational Therapy: Daily Note   Patient: Veronica Marie Fritsch (24 y.o. female)   Examination Date: 2024  No data recorded  No data recorded   :  2000 # of Visits since SOC:   58   MRN: [id number]  CSN: [id number] Start of Care Date: 2023   Insurance: Pay[place] / [place]: [place] / Product Type: *No Product type* /   Insurance ID: [id number] - (Medicaid Managed) Secondary Insurance (if applicable):    Insurance Information: [employer] Medicaid  (30 visits OT then pt will need authorization for futher visits). Approved visits from  (therapeutic exercises(04391) 60 units,manual therapy (64749) 40 units,therapeutic exercises, therapeutic activity (78851) 40 units).   Referring Physician: Marybel Nava MD Dr Ashley Schneider at [facility] per pt request.   PCP: Valentín Prasad PA Visits to Date/Visits Approved: 56 (36 visits in ) / 70    No Show/Cancelled Appts:   /       Medical Diagnosis: Pain in both wrists [M25.531, M25.532]  Complex regional pain syndrome type 1, affecting unspecified site [G90.50] pain in both wrists (M25.531, M25.532) ICD-10 code, complex regional pain syndrome type 1 affection unspecified site (G90.50) ICD-10 code  Treatment Diagnosis: bilateral UE & hand weakness,impaired coordination, impaired sensation, pain, stiffness( bilateral shoulders,elbow flexion,supination,wrist UD) & rt wrist extension (ICD-10 codes: M62.81, R27.9, R20.2, M79.621, M79.622, M79.631, M79.632, M79.641, M79.642)        SUBJECTIVE EXAMINATION   Pain Level: Pain Screening  Patient Currently in Pain: Yes  Pain Assessment: 0-10  Pain Level: 7 (pain 7elbows & hips, rest of body is 6.)  Post Treatment Pain Level: 7 (pain 7elbows &

## 2024-05-23 ENCOUNTER — HOSPITAL ENCOUNTER (OUTPATIENT)
Dept: OCCUPATIONAL THERAPY | Age: 24
Setting detail: THERAPIES SERIES
Discharge: HOME | End: 2024-05-23
Payer: MEDICAID

## 2024-05-23 PROCEDURE — 97110 THERAPEUTIC EXERCISES: CPT

## 2024-05-23 PROCEDURE — 97140 MANUAL THERAPY 1/> REGIONS: CPT

## 2024-05-23 NOTE — FLOWSHEET NOTE
[facility]  Outpatient Rehabilitation &  Therapy  [address]     [phone]  F: [phone]   [] [facility]  Outpatient Rehabilitation &  Therapy  [address]  P: [phone]  F: [phone] [] [facility]  Outpatient Rehabilitation &  Therapy  [address]  P: [phone]  F: [phone]  [x] [facility]   Outpatient Rehabilitation & Therapy  [address]  P: [phone]   F: [phone]     Occupational Therapy Daily Treatment Note    Date:  2024  Patient Name:  Veronica Marie Fritsch    :  2000  MRN: [id number]  Physician: Referring Physician: Josh Patel MD pt's new MD - Dr Leon Schaeffer   PCP: Valentín Prasad PA                                Insurance[place] / [place]: [place] / Product Type: *No Product type* /   Insurance ID: [id number] - (Medicaid Managed)  Diagnosis: Medical Diagnosis: Pain in both wrists [M25.531, M25.532]  Complex regional pain syndrome type 1, affecting unspecified site [G90.50] pain in both wrists (M25.531, M25.532) ICD-10 code, complex regional pain syndrome type 1 affection unspecified site (G90.50) ICD-10 code  Treatment Diagnosis: bilateral UE & hand weakness,impaired coordination, impaired sensation, pain, stiffness( bilateral shoulders,elbow flexion,supination,wrist UD) & rt wrist extension (ICD-10 codes: M62.81, R27.9, R20.2, M79.621, M79.622, M79.631, M79.632, M79.641, M79.642)          Visit# / total visits: 57/; Progress note for patient due at visit 10    Cancels/No Shows: 3/0      Subjective:    Pain:  [x] Yes  [] No Location: all over  Pain Rating: (0-10 scale) 6/10  Pain altered Tx:  [x] No  [] Yes  Action:  Pt Comments: Pt reports pain however did state that she is cooking a little more. Able to make scrambled eggs, crack the egg but limited in the amount of time she can cook

## 2024-06-11 ENCOUNTER — HOSPITAL ENCOUNTER (OUTPATIENT)
Dept: OCCUPATIONAL THERAPY | Age: 24
Setting detail: THERAPIES SERIES
Discharge: HOME | End: 2024-06-11
Payer: MEDICAID

## 2024-06-11 PROCEDURE — 97110 THERAPEUTIC EXERCISES: CPT

## 2024-06-11 PROCEDURE — 97140 MANUAL THERAPY 1/> REGIONS: CPT

## 2024-06-11 NOTE — FLOWSHEET NOTE
open jars/bottles easier.                              7. Increase lt hand strength:  by 10# & pinch (lateral, bauer, tip) by 2# so that pt will be able to open jars/bottles easier.                              8. Pt will report improved bilateral hand coordination for fine motor ADLs (button manipulation,cutting food). Pt will complete 9 hole peg test 10 seconds quicker than eval with rt hand/lt hand.                                 9. Pt will report increase UEFI total score by 10 points & report that doing ADLS & light IADLs are a little easier to do.                                 Long Term Goals Completed by 20 visits + 20 visits + 20 visits + 30 visits approved (4/23/24-6/23/2024) Current Status Goal Status   Compared to eval:Pt will report increase UEFI total score by 15-20 points & report that doing ADLS & light IADLs are a little easier to do.     2. Pt will report decrease in bilateral UE pain to 3 when she completes her daily activities and exercises.     3. Compared to eval :Decrease muscle tightness & Increase PROM bilateral UE shoulder (flexion, abduction) by 20     4. Compared to eval : Increase AROM by 20 rt shoulder flexion & abduction, & by 15 rt elbow to improve functional motion for getting dressed & reaching into cabinets.     5. Compared to eval: Increase AROM by  20 lt shoulder flexion & abduction to improve functional motion for getting dressed & reaching into cabinets.       6. Compared to eval: Increase rt hand strength:  by 15-20# & pinch (lateral , bauer, tip) by 4-5 # so that pt will be able to open jars/bottles easier.                             7. Compared to eval: Increase lt hand strength:  by 15-20# & pinch (lateral, bauer, tip) by 4-5# so that pt will be able to open jars/bottles easier.                                8. Compared to eval:Pt will report improved bilateral hand coordination for fine motor ADLs (button manipulation,cutting food). Pt will complete 9

## 2024-06-13 ENCOUNTER — HOSPITAL ENCOUNTER (OUTPATIENT)
Dept: OCCUPATIONAL THERAPY | Age: 24
Setting detail: THERAPIES SERIES
Discharge: HOME | End: 2024-06-13
Payer: MEDICAID

## 2024-06-13 PROCEDURE — 97110 THERAPEUTIC EXERCISES: CPT

## 2024-06-13 PROCEDURE — 97140 MANUAL THERAPY 1/> REGIONS: CPT

## 2024-06-13 NOTE — FLOWSHEET NOTE
- issued and reviewed HEP for tan theraputty - , roll, pinch, pull   Other:  OT Exercise 1: PROM/AAROM  bilateral UE (in all directions-flexion,abduction,diagonals to rt/lt sides) & hand ( pt supine on mat) ,bilateral upper trapezius massage, & scapula protraction/retraction gentle mobilization  (pt sitting edge of mat.) complete  OT Exercise 2: yellow small  therapy ball bilateral UE 2 set  10x each way( press into sides of ball & press top of ball), 2 set 10x scapula pro/retraction with elbow flex/extension reaching forward/back, 2 sets 10xeach way (UE horizontal abduction/adduction, shoulder flex/extension)- pt sitting complete  OT Exercise 3: finger ladder forward reach: rt UE up/down 3 sets ht 23, lt UE up/down 3 sets ht  23 complete  OT Exercise 6: yellow theraband flex bar - using bilateral hands rolling bar forward/back on table (from hands) 10x, bilateral UE make U & reverse U 10x each, twist for wrist flex/extension 10x each, 10x each way (make C & make reverse C) complete  OT Exercise 9: rope pulley for:  bilateral UE shoulder flexion -  3 minutes, bilateral shoulder abduction with scaption  3 minutes ( for improving bilateral shoulder AROM & decrease muscle tightness) complete  OT Exercise 14: red theratubing on wall mount: using bilateral UE 2 sets of  10x (pull down for UE extension,scapula pinches/pulls ) & 2 set of 10x bicep curls Not today  OT Exercise 15: key pegs - place  metal pegs in board then remove pegs & hold in hand In by 5, out by 5 Right and left Complete  OT Exercise 16: BTE tool 162 gripping : T =5 inlb 90 seconds (rt hand), T = 5 inlb   90  seconds  (lt hand) not today  OT Exercise 17: 8 suacers (reach alternating rt/lt UE to remove  4 saucers each from upper cabinet (bottom shelf 18\" from counter top, then place on countertop , then place saucers back on shelf alternating rt/lt UE) - 2 sets completed  OT Exercise: Dowel 1# in supine chest press x10, shl flexion x5  Complete  OT

## 2024-06-17 ENCOUNTER — HOSPITAL ENCOUNTER (OUTPATIENT)
Dept: OCCUPATIONAL THERAPY | Age: 24
Setting detail: THERAPIES SERIES
Discharge: HOME | End: 2024-06-17
Payer: MEDICAID

## 2024-06-17 PROCEDURE — 97140 MANUAL THERAPY 1/> REGIONS: CPT

## 2024-06-17 PROCEDURE — 97110 THERAPEUTIC EXERCISES: CPT

## 2024-06-17 ASSESSMENT — PAIN DESCRIPTION - PAIN TYPE: TYPE: ACUTE PAIN

## 2024-06-17 ASSESSMENT — PAIN DESCRIPTION - ORIENTATION: ORIENTATION: LEFT;RIGHT

## 2024-06-17 ASSESSMENT — PAIN SCALES - GENERAL: PAINLEVEL_OUTOF10: 6

## 2024-06-20 ENCOUNTER — HOSPITAL ENCOUNTER (OUTPATIENT)
Dept: OCCUPATIONAL THERAPY | Age: 24
Setting detail: THERAPIES SERIES
Discharge: HOME | End: 2024-06-20
Payer: MEDICAID

## 2024-06-20 PROCEDURE — 97110 THERAPEUTIC EXERCISES: CPT

## 2024-06-20 PROCEDURE — 97140 MANUAL THERAPY 1/> REGIONS: CPT

## 2024-06-20 NOTE — FLOWSHEET NOTE
[facility]  Outpatient Rehabilitation &  Therapy  [address] .     [phone]  F: [phone]   [] [facility]  Outpatient Rehabilitation &  Therapy  [address]   [address]  P: [phone]  F: [phone] [] [facility]  Outpatient Rehabilitation &  Therapy  [address]  P: [phone]  F: [phone]  [x] [facility]   Outpatient Rehabilitation & Therapy  [address]  P: [phone]   F: [phone]     Occupational Therapy Daily Treatment Note    Date:  2024  Patient Name:  Veronica Marie Fritsch    :  2000  MRN: [id number]  Physician: Referring Physician: Jsoh Patel MD pt's morena SERRATO - Dr Leon Schaeffer   PCP: Valentín Prasad PA                                Insurance[place] / [place]: [place] / Product Type: *No Product type* /   Insurance ID: [id number] - (Medicaid Managed)  Diagnosis: Medical Diagnosis: Pain in both wrists [M25.531, M25.532]  Complex regional pain syndrome type 1, affecting unspecified site [G90.50] pain in both wrists (M25.531, M25.532) ICD-10 code, complex regional pain syndrome type 1 affection unspecified site (G90.50) ICD-10 code  Treatment Diagnosis: bilateral UE & hand weakness,impaired coordination, impaired sensation, pain, stiffness( bilateral shoulders,elbow flexion,supination,wrist UD) & rt wrist extension (ICD-10 codes: M62.81, R27.9, R20.2, M79.621, M79.622, M79.631, M79.632, M79.641, M79.642)          Visit# / total visits: 61/; Progress note for patient due at visit 10    Cancels/No Shows: 3/0      Subjective:    Pain:  [x] Yes  [] No Location: all over  Pain Rating: (0-10 scale) 7/10 everywhere - pt states she was a 4/10 this morning then she bumped he elbow  Pain altered Tx:  [x] No  [] Yes  Action:  Pt Comments: Pt reports pain was doing well this am then she bumped her elbow and increased

## 2024-06-24 ENCOUNTER — HOSPITAL ENCOUNTER (OUTPATIENT)
Dept: OCCUPATIONAL THERAPY | Age: 24
Setting detail: THERAPIES SERIES
Discharge: HOME | End: 2024-06-24
Payer: MEDICAID

## 2024-06-24 PROCEDURE — 97140 MANUAL THERAPY 1/> REGIONS: CPT

## 2024-06-24 PROCEDURE — 97110 THERAPEUTIC EXERCISES: CPT

## 2024-06-24 ASSESSMENT — PAIN DESCRIPTION - ORIENTATION: ORIENTATION: RIGHT;LEFT

## 2024-06-24 ASSESSMENT — PAIN DESCRIPTION - PAIN TYPE: TYPE: ACUTE PAIN

## 2024-06-24 ASSESSMENT — PAIN SCALES - GENERAL: PAINLEVEL_OUTOF10: 7

## 2024-06-24 NOTE — FLOWSHEET NOTE
[facility] Outpatient Rehabilitation and Therapy  Occupational Therapy  [address]    [place], [place] [address]  Phone: [phone]  Fax: [phone]     Occupational Therapy Daily Treatment note     Occupational Therapy: Daily Note   Patient: Veronica Marie Fritsch (24 y.o. female)   Examination Date: 2024  No data recorded  No data recorded   :  2000 # of Visits since SOC:   64   MRN: [id number]  CSN: [id number] Start of Care Date: 2023   Insurance: Payor: [place] / [place]: [place] / Product Type: *No Product type* /   Insurance ID: [id number] - (Medicaid Managed) Secondary Insurance (if applicable):    Insurance Information: [employer] Medicaid  (30 visits OT then pt will need authorization for futher visits). Approved visits from  (therapeutic exercises(36444) 60 units,manual therapy (52061) 40 units,therapeutic exercises, therapeutic activity (76660) 40 units).   Referring Physician: Marybel Nava MD Dr Ashley Schneider at [facility] per pt request.   PCP: Valentín Prasad PA Visits to Date/Visits Approved: 62 (42 visits in ) / 70    No Show/Cancelled Appts:   /       Medical Diagnosis: Pain in both wrists [M25.531, M25.532]  Complex regional pain syndrome type 1, affecting unspecified site [G90.50] pain in both wrists (M25.531, M25.532) ICD-10 code, complex regional pain syndrome type 1 affection unspecified site (G90.50) ICD-10 code  Treatment Diagnosis: bilateral UE & hand weakness,impaired coordination, impaired sensation, pain, stiffness( bilateral shoulders,elbow flexion,supination,wrist UD) & rt wrist extension (ICD-10 codes: M62.81, R27.9, R20.2, M79.621, M79.622, M79.631, M79.632, M79.641, M79.642)        SUBJECTIVE EXAMINATION   Pain Level: Pain Screening  Patient Currently in Pain: Yes  Pain Assessment: 0-10  Pain Level: 7  Post Treatment Pain Level: 7  Pain Type: Acute pain  Pain Location: Arm, Generalized,  Omnipaque given prior to ct exam. [place] Repress

## 2024-06-26 ENCOUNTER — HOSPITAL ENCOUNTER (OUTPATIENT)
Dept: OCCUPATIONAL THERAPY | Age: 24
Setting detail: THERAPIES SERIES
Discharge: HOME | End: 2024-06-26
Payer: MEDICAID

## 2024-06-26 PROCEDURE — 97110 THERAPEUTIC EXERCISES: CPT

## 2024-06-26 PROCEDURE — 97140 MANUAL THERAPY 1/> REGIONS: CPT

## 2024-06-26 NOTE — FLOWSHEET NOTE
[facility]  Outpatient Rehabilitation &  Therapy  [address]     [phone]  F: [phone]   [] [facility]  Outpatient Rehabilitation &  Therapy  [address]   [address]  P: [phone]  F: [phone] [] [facility]  Outpatient Rehabilitation &  Therapy  [address]  P: [phone]  F: [phone]  [x] [facility]   Outpatient Rehabilitation & Therapy  [address]  P: [phone]   F: [phone]     Occupational Therapy Daily Treatment Note    Date:  2024  Patient Name:  Veronica Marie Fritsch    :  2000  MRN: [id number]  Physician: Referring Physician: Josh Patel MD pt's morena SERRATO - Dr Leon Schaeffer   PCP: Valentín Prasad PA                                Insurance[place] / [place]: [place] / Product Type: *No Product type* /   Insurance ID: [id number] - (Medicaid Managed)  Diagnosis: Medical Diagnosis: Pain in both wrists [M25.531, M25.532]  Complex regional pain syndrome type 1, affecting unspecified site [G90.50] pain in both wrists (M25.531, M25.532) ICD-10 code, complex regional pain syndrome type 1 affection unspecified site (G90.50) ICD-10 code  Treatment Diagnosis: bilateral UE & hand weakness,impaired coordination, impaired sensation, pain, stiffness( bilateral shoulders,elbow flexion,supination,wrist UD) & rt wrist extension (ICD-10 codes: M62.81, R27.9, R20.2, M79.621, M79.622, M79.631, M79.632, M79.641, M79.642)          Visit# / total visits: 63/70; Progress note for patient due at visit 10    Cancels/No Shows: 3/0      Subjective:    Pain:  [x] Yes  [] No Location: all over  Pain Rating: (0-10 scale) 7/10 everywhere - jaw is really bothering her  Pain altered Tx:  [x] No  [] Yes  Action:  Pt Comments: Pt did not wear her mouth guards last night. Pt reports being able to thread a needle and stitch 15 stitches

## 2024-07-02 ENCOUNTER — HOSPITAL ENCOUNTER (OUTPATIENT)
Dept: OCCUPATIONAL THERAPY | Age: 24
Setting detail: THERAPIES SERIES
Discharge: HOME | End: 2024-07-02
Payer: MEDICAID

## 2024-07-02 ENCOUNTER — TELEPHONE (OUTPATIENT)
Dept: PRIMARY CARE CLINIC | Age: 24
End: 2024-07-02

## 2024-07-02 DIAGNOSIS — G90.513 COMPLEX REGIONAL PAIN SYNDROME AFFECTING BOTH UPPER ARMS: Primary | ICD-10-CM

## 2024-07-02 PROCEDURE — 97110 THERAPEUTIC EXERCISES: CPT

## 2024-07-02 PROCEDURE — 97140 MANUAL THERAPY 1/> REGIONS: CPT

## 2024-07-02 ASSESSMENT — PAIN SCALES - GENERAL: PAINLEVEL_OUTOF10: 8

## 2024-07-02 ASSESSMENT — PAIN DESCRIPTION - ORIENTATION: ORIENTATION: RIGHT;LEFT

## 2024-07-02 ASSESSMENT — PAIN DESCRIPTION - PAIN TYPE: TYPE: ACUTE PAIN

## 2024-07-02 NOTE — TELEPHONE ENCOUNTER
Patient called stating she needs documentation and/or a letter with her Name, age, Dx of Complex Regional Syndrome, and why treatment is necessary for the [facility]. She stated she discussed this clinic with you that's located in [place].    Please advise.

## 2024-07-02 NOTE — FLOWSHEET NOTE
education & training  Additional Comments: continue OT       Therapy Time  Individual Time In: 0945  Individual Time Out: 1030  Minutes: 45  Timed Code Treatment Minutes: 45 Minutes       Electronically signed by Loreto Moore OT  on 7/2/2024 at 12:43 PM       Treatment Charges: Mins Units Time In/Out   [] Evaluation       []  Low       []  Moderate       []  High      []  Electrical Stim      []  Iontophoresis      []  Paraffin      []  Ultrasound        []  Masage      []  Neuromuscular Re-ed      []  ADL      [x]  Ther Exercise 30 1 09:45-10:00, 10:15-10:30   []  Ther Activities      []  Neuro Re-Ed      []  Splinting      [x] manual therapy 15 1 10:00-10:15   Total Treatment time 45 min 3          POC NOTE

## 2024-07-03 NOTE — TELEPHONE ENCOUNTER
Please clarify what we should put for this portion of the letter \"why treatment is necessary for the [facility] \"

## 2024-07-05 ENCOUNTER — HOSPITAL ENCOUNTER (OUTPATIENT)
Dept: OCCUPATIONAL THERAPY | Age: 24
Setting detail: THERAPIES SERIES
Discharge: HOME | End: 2024-07-05
Payer: MEDICAID

## 2024-07-05 PROCEDURE — 97140 MANUAL THERAPY 1/> REGIONS: CPT

## 2024-07-05 PROCEDURE — 97110 THERAPEUTIC EXERCISES: CPT

## 2024-07-05 ASSESSMENT — PAIN SCALES - GENERAL: PAINLEVEL_OUTOF10: 6

## 2024-07-05 ASSESSMENT — PAIN DESCRIPTION - PAIN TYPE: TYPE: ACUTE PAIN

## 2024-07-05 ASSESSMENT — PAIN DESCRIPTION - ORIENTATION: ORIENTATION: RIGHT;LEFT

## 2024-07-05 NOTE — FLOWSHEET NOTE
Patient/Caregiver education & training  Additional Comments: continue OT       Therapy Time  Individual Time In: 1020  Individual Time Out: 1116  Minutes: 56  Timed Code Treatment Minutes: 56 Minutes       Electronically signed by Loreto Moore OT  on 7/5/2024 at 12:26 PM       Treatment Charges: Mins Units Time In/Out   [] Evaluation       []  Low       []  Moderate       []  High      []  Electrical Stim      []  Iontophoresis      []  Paraffin      []  Ultrasound        []  Masage      []  Neuromuscular Re-ed      []  ADL      [x]  Ther Exercise 40 3 10:20-10:40,10:56-11:16   []  Ther Activities      []  Neuro Re-Ed      []  Splinting      [x] manual therapy 16 1 10:40-10:56   Total Treatment time 56 min 4          POC NOTE

## 2024-07-08 ENCOUNTER — APPOINTMENT (OUTPATIENT)
Dept: OCCUPATIONAL THERAPY | Age: 24
End: 2024-07-08
Payer: MEDICAID

## 2024-07-10 ENCOUNTER — HOSPITAL ENCOUNTER (OUTPATIENT)
Dept: OCCUPATIONAL THERAPY | Age: 24
Setting detail: THERAPIES SERIES
Discharge: HOME | End: 2024-07-10
Payer: MEDICAID

## 2024-07-10 PROCEDURE — 97140 MANUAL THERAPY 1/> REGIONS: CPT

## 2024-07-10 PROCEDURE — 97110 THERAPEUTIC EXERCISES: CPT

## 2024-07-10 ASSESSMENT — PAIN DESCRIPTION - ORIENTATION: ORIENTATION: LEFT;RIGHT

## 2024-07-10 ASSESSMENT — PAIN DESCRIPTION - PAIN TYPE: TYPE: ACUTE PAIN

## 2024-07-10 ASSESSMENT — PAIN SCALES - GENERAL: PAINLEVEL_OUTOF10: 8

## 2024-07-10 NOTE — FLOWSHEET NOTE
[facility] Outpatient Rehabilitation and Therapy  Occupational Therapy  [address]    [place], [place] [address]  Phone: [phone]  Fax: [phone]     Occupational Therapy Daily Treatment note     Occupational Therapy: Daily Note   Patient: Veronica Marie Fritsch (24 y.o. female)   Examination Date: 07/10/2024  No data recorded  No data recorded   :  2000 # of Visits since SOC:   68   MRN: [id number]  CSN: [id number] Start of Care Date: 2023   Insurance: Payor: [place] / Plan: [place] / Product Type: *No Product type* /   Insurance ID: [id number] - (Medicaid Managed) Secondary Insurance (if applicable):    Insurance Information: [employer] Medicaid  (30 visits OT then pt will need authorization for futher visits). Approved visits from  (therapeutic exercises(35498) 60 units,manual therapy (27166) 40 units,therapeutic exercises, therapeutic activity (15303) 40 units).   Referring Physician: Marybel Nava MD Dr Ashley Schneider at [facility] per pt request.   PCP: Valentín Prasad PA Visits to Date/Visits Approved: 66 (46 visits in ) / 70    No Show/Cancelled Appts:   /       Medical Diagnosis: Pain in both wrists [M25.531, M25.532]  Complex regional pain syndrome type 1, affecting unspecified site [G90.50] pain in both wrists (M25.531, M25.532) ICD-10 code, complex regional pain syndrome type 1 affection unspecified site (G90.50) ICD-10 code  Treatment Diagnosis: bilateral UE & hand weakness,impaired coordination, impaired sensation, pain, stiffness( bilateral shoulders,elbow flexion,supination,wrist UD) & rt wrist extension (ICD-10 codes: M62.81, R27.9, R20.2, M79.621, M79.622, M79.631, M79.632, M79.641, M79.642)        SUBJECTIVE EXAMINATION   Pain Level: Pain Screening  Patient Currently in Pain: Yes  Pain Assessment: 0-10  Pain Level: 8 (Pt lt side neck,shoulder , upper arm pain 8, pain rest of her body 6.)  Post Treatment

## 2024-07-11 ENCOUNTER — HOSPITAL ENCOUNTER (OUTPATIENT)
Age: 24
Setting detail: SPECIMEN
Discharge: HOME | End: 2024-07-11

## 2024-07-11 ENCOUNTER — OFFICE VISIT (OUTPATIENT)
Dept: PRIMARY CARE CLINIC | Age: 24
End: 2024-07-11
Payer: MEDICAID

## 2024-07-11 VITALS — OXYGEN SATURATION: 99 % | BODY MASS INDEX: 29.3 KG/M2 | WEIGHT: 159.2 LBS | HEART RATE: 87 BPM | HEIGHT: 62 IN

## 2024-07-11 DIAGNOSIS — R30.0 DYSURIA: ICD-10-CM

## 2024-07-11 DIAGNOSIS — G90.513 COMPLEX REGIONAL PAIN SYNDROME TYPE 1 OF BOTH UPPER EXTREMITIES: ICD-10-CM

## 2024-07-11 DIAGNOSIS — G90.513 COMPLEX REGIONAL PAIN SYNDROME AFFECTING BOTH UPPER ARMS: ICD-10-CM

## 2024-07-11 DIAGNOSIS — D69.1 PLATELET FUNCTION DEFECT (HCC): Primary | ICD-10-CM

## 2024-07-11 LAB
BILIRUBIN, POC: NEGATIVE
BLOOD URINE, POC: NEGATIVE
CLARITY, POC: ABNORMAL
COLOR, POC: ABNORMAL
GLUCOSE URINE, POC: NEGATIVE
KETONES, POC: NEGATIVE
LEUKOCYTE EST, POC: ABNORMAL
NITRITE, POC: NEGATIVE
PH, POC: 6.5
PROTEIN, POC: NEGATIVE
SPECIFIC GRAVITY, POC: 1.01
UROBILINOGEN, POC: ABNORMAL

## 2024-07-11 PROCEDURE — 99214 OFFICE O/P EST MOD 30 MIN: CPT | Performed by: PHYSICIAN ASSISTANT

## 2024-07-11 PROCEDURE — 81003 URINALYSIS AUTO W/O SCOPE: CPT | Performed by: PHYSICIAN ASSISTANT

## 2024-07-11 RX ORDER — CALCIUM CARB/VITAMIN D3/VIT K1 500-500-40
200 TABLET,CHEWABLE ORAL 2 TIMES DAILY
COMMUNITY
Start: 2024-06-21 | End: 2024-10-19

## 2024-07-11 RX ORDER — NITROFURANTOIN 25; 75 MG/1; MG/1
100 CAPSULE ORAL 2 TIMES DAILY
Qty: 10 CAPSULE | Refills: 0 | Status: SHIPPED | OUTPATIENT
Start: 2024-07-11 | End: 2024-07-16

## 2024-07-11 ASSESSMENT — ENCOUNTER SYMPTOMS
CHEST TIGHTNESS: 0
COUGH: 0
SHORTNESS OF BREATH: 0
SORE THROAT: 0
DIARRHEA: 0
ABDOMINAL DISTENTION: 0
ABDOMINAL PAIN: 0
CONSTIPATION: 0

## 2024-07-11 NOTE — PROGRESS NOTES
[facility]  [facility] PRIMARY CARE  [address] [address]  Dept: [phone]    Veronica Marie Fritsch is a 24 y.o. female Established patient, who presents today for her medical conditions/complaints as noted below.      Chief Complaint   Patient presents with    platelet function defect     F/u       HPI:     HPI: The patient is a pleasant 24-year-old female presents today with concerns of follow-up for complex regional pain syndrome and platelet function defect.  Patient currently taking gabapentin following with a complex regional pain syndrome clinic.  They have prescribed herbal supplements to help patient.  Patient in therapy to help with pain.  Needs to complete Pap.     Had diagnosis CRPS in September last year. Doctor tere diagnosed this. Had it since 2022.     Has been fund raising to get to [facility]. Needs letter for a positive diagnosis for  this and that [facility] is a treatment center. Needs Name age, , Confirmed Diagnosis and needs to say [facility] is a necessary treatment option. This is humza edith to full use of resources around this area which have been used without full benefit. Goal of 100,000 will cover treatment and machines at the clinic. Living expenses at the program which is 14 week program. Saw Dr. Alex and Maksim. Will need diagnosis of CRPS proven and I will write note.     [facility] needs letter which states that due to health and CRPS and therapy patient is unable to work at this time.     Since condition has not change needs a letter which states that diagnosis has not changed.     Reviewed prior notes None  Reviewed previous Labs    No components found for: \"LDLCHOLESTEROL\", \"LDLCALC\"    (goal LDL is <100)   AST (U/L)   Date Value   2023 17     ALT (U/L)   Date Value   2023 16     BUN (mg/dL)   Date Value   2023 12     TSH (uIU/mL)   Date Value   2023 2.24     BP Readings from Last 3 Encounters:

## 2024-07-12 ENCOUNTER — HOSPITAL ENCOUNTER (OUTPATIENT)
Dept: OCCUPATIONAL THERAPY | Age: 24
Setting detail: THERAPIES SERIES
Discharge: HOME | End: 2024-07-12
Payer: MEDICAID

## 2024-07-12 LAB
MICROORGANISM SPEC CULT: NORMAL
SERVICE CMNT-IMP: NORMAL
SPECIMEN DESCRIPTION: NORMAL

## 2024-07-12 PROCEDURE — 97110 THERAPEUTIC EXERCISES: CPT

## 2024-07-12 PROCEDURE — 97140 MANUAL THERAPY 1/> REGIONS: CPT

## 2024-07-12 ASSESSMENT — PAIN DESCRIPTION - PAIN TYPE: TYPE: ACUTE PAIN

## 2024-07-12 ASSESSMENT — PAIN DESCRIPTION - ORIENTATION: ORIENTATION: LEFT;RIGHT

## 2024-07-12 ASSESSMENT — PAIN SCALES - GENERAL: PAINLEVEL_OUTOF10: 7

## 2024-07-12 NOTE — FLOWSHEET NOTE
[facility] Outpatient Rehabilitation and Therapy  Occupational Therapy  [address]    [place], [place] [address]  Phone: [phone]  Fax: [phone]     Occupational Therapy Daily Treatment note     Occupational Therapy: Daily Note   Patient: Veronica Marie Fritsch (24 y.o. female)   Examination Date: 2024  No data recorded  No data recorded   :  2000 # of Visits since SOC:   69   MRN: [id number]  CSN: [id number] Start of Care Date: 2023   Insurance: Payor: [place] / Plan: [place] / Product Type: *No Product type* /   Insurance ID: [id number] - (Medicaid Managed) Secondary Insurance (if applicable):    Insurance Information: [employer] Medicaid  (30 visits OT then pt will need authorization for futher visits). Approved visits from  (therapeutic exercises(04662) 60 units,manual therapy (06483) 40 units,therapeutic exercises, therapeutic activity (57009) 40 units).   Referring Physician: Marybel Nava MD Dr Ashley Schneider at [facility] per pt request.   PCP: Valentín Prasad PA Visits to Date/Visits Approved: 67 (47 visits in ) / 70    No Show/Cancelled Appts:   /       Medical Diagnosis: Pain in both wrists [M25.531, M25.532]  Complex regional pain syndrome type 1, affecting unspecified site [G90.50] pain in both wrists (M25.531, M25.532) ICD-10 code, complex regional pain syndrome type 1 affection unspecified site (G90.50) ICD-10 code  Treatment Diagnosis: bilateral UE & hand weakness,impaired coordination, impaired sensation, pain, stiffness( bilateral shoulders,elbow flexion,supination,wrist UD) & rt wrist extension (ICD-10 codes: M62.81, R27.9, R20.2, M79.621, M79.622, M79.631, M79.632, M79.641, M79.642)        SUBJECTIVE EXAMINATION   Pain Level: Pain Screening  Patient Currently in Pain: Yes  Pain Assessment: 0-10  Pain Level: 7  Post Treatment Pain Level: 7  Pain Type: Acute pain  Pain Location: Arm, Back, Hand,

## 2024-07-15 ENCOUNTER — HOSPITAL ENCOUNTER (OUTPATIENT)
Dept: OCCUPATIONAL THERAPY | Age: 24
Setting detail: THERAPIES SERIES
Discharge: HOME | End: 2024-07-15
Payer: MEDICAID

## 2024-07-15 PROCEDURE — 97110 THERAPEUTIC EXERCISES: CPT

## 2024-07-15 PROCEDURE — 97140 MANUAL THERAPY 1/> REGIONS: CPT

## 2024-07-15 ASSESSMENT — PAIN SCALES - GENERAL: PAINLEVEL_OUTOF10: 6

## 2024-07-15 ASSESSMENT — PAIN DESCRIPTION - ORIENTATION: ORIENTATION: LEFT;RIGHT

## 2024-07-15 ASSESSMENT — PAIN DESCRIPTION - PAIN TYPE: TYPE: ACUTE PAIN

## 2024-07-15 NOTE — FLOWSHEET NOTE
[facility] Outpatient Rehabilitation and Therapy  Occupational Therapy  [address]    [place], [place] [address]  Phone: [phone]  Fax: [phone]     Occupational Therapy Daily Treatment note     Occupational Therapy: Daily Note   Patient: Veronica Marie Fritsch (24 y.o. female)   Examination Date: 07/15/2024  No data recorded  No data recorded   :  2000 # of Visits since SOC:   70   MRN: [id number]  CSN: [id number] Start of Care Date: 2023   Insurance: Payor: [place] / Plan: [place] / Product Type: *No Product type* /   Insurance ID: [id number] - (Medicaid Managed) Secondary Insurance (if applicable):    Insurance Information: [employer] Medicaid  (30 visits OT then pt will need authorization for futher visits). Approved visits from  (therapeutic exercises(66369) 60 units,manual therapy (62658) 40 units,therapeutic exercises, therapeutic activity (84667) 40 units).   Referring Physician: Marybel Nava MD Dr Ashley Schneider at [facility] per pt request.   PCP: Valentín Prasad PA Visits to Date/Visits Approved: 68 (48 visits in ) / 70    No Show/Cancelled Appts:   /       Medical Diagnosis: Pain in both wrists [M25.531, M25.532]  Complex regional pain syndrome type 1, affecting unspecified site [G90.50] pain in both wrists (M25.531, M25.532) ICD-10 code, complex regional pain syndrome type 1 affection unspecified site (G90.50) ICD-10 code  Treatment Diagnosis: bilateral UE & hand weakness,impaired coordination, impaired sensation, pain, stiffness( bilateral shoulders,elbow flexion,supination,wrist UD) & rt wrist extension (ICD-10 codes: M62.81, R27.9, R20.2, M79.621, M79.622, M79.631, M79.632, M79.641, M79.642)        SUBJECTIVE EXAMINATION   Pain Level: Pain Screening  Patient Currently in Pain: Yes  Pain Assessment: 0-10  Pain Level: 6  Post Treatment Pain Level: 6  Pain Type: Acute pain  Pain Location: Arm, Back, Jaw,

## 2024-07-17 ENCOUNTER — HOSPITAL ENCOUNTER (OUTPATIENT)
Dept: OCCUPATIONAL THERAPY | Age: 24
Setting detail: THERAPIES SERIES
Discharge: HOME | End: 2024-07-17
Payer: MEDICAID

## 2024-07-17 PROCEDURE — 97110 THERAPEUTIC EXERCISES: CPT

## 2024-07-17 PROCEDURE — 97140 MANUAL THERAPY 1/> REGIONS: CPT

## 2024-07-17 ASSESSMENT — PAIN SCALES - GENERAL: PAINLEVEL_OUTOF10: 7

## 2024-07-17 ASSESSMENT — PAIN DESCRIPTION - PAIN TYPE: TYPE: ACUTE PAIN

## 2024-07-17 ASSESSMENT — PAIN DESCRIPTION - ORIENTATION: ORIENTATION: LEFT;RIGHT

## 2024-07-17 NOTE — FLOWSHEET NOTE
Coordination training, Patient/Caregiver education & training  Additional Comments: continue OT       Therapy Time  Individual Time In: 0951  Individual Time Out: 1041  Minutes: 50  Timed Code Treatment Minutes: 50 Minutes       Electronically signed by Loreto Moore OT  on 7/17/2024 at 10:43 AM       Treatment Charges: Mins Units Time In/Out   [] Evaluation       []  Low       []  Moderate       []  High      []  Electrical Stim      []  Iontophoresis      []  Paraffin      []  Ultrasound        []  Masage      []  Neuromuscular Re-ed      []  ADL      [x]  Ther Exercise 35 2 09:51-10:15,10:30-10:41   []  Ther Activities      []  Neuro Re-Ed      []  Splinting      [x]  manual therapy 15 1 10:15-10:30   Total Treatment time 50 min 3          POC NOTE

## 2024-07-23 ENCOUNTER — HOSPITAL ENCOUNTER (OUTPATIENT)
Dept: OCCUPATIONAL THERAPY | Age: 24
Setting detail: THERAPIES SERIES
Discharge: HOME | End: 2024-07-23
Payer: MEDICAID

## 2024-07-23 NOTE — PROGRESS NOTES
(increased by 13.33#),bauer pinch (increased by 2.67#), tip pinch (increased by 2.67#)   LTG Goal 6 Status: Partially met;Met;In progress   Long Term Goal 7 Compared to eval: Increase lt hand strength:  by 15-20# & pinch (lateral, bauer, tip) by 4-5# so that pt will be able to open jars/bottles easier.   LTG 7 Current Status: Goal Met  (increase by 26.67#),lateral  pinch (increased by 8.67#). Partially Met/goal ongoing bauer pinch (increased by 4.67#), tip pinch (increased by 2.67#)   LTG Goal 7 Status: Met;Partially met;In progress   Long Term Goal 8 Compared to eval:Pt will report improved bilateral hand coordination for fine motor ADLs (button manipulation,cutting food). Pt will complete 9 hole peg test 20 seconds quicker than eval with rt hand/lt hand.   LTG 8 Current Status: Bilateral hand coordination met (pt quicker by 28 seconds with lt hand, quicker by 25 seconds with rt hand ) using 9 hole peg test, button manipulation. Pt reports doing  good using rocker knife , but difficulty using regular knife.   LTG Goal 8 Status: Met   Long Term Goal 9 Increase lt UE strength: shoulder (flexion,abdcutor) to 3+/5, shoulder (IR,ER) to 4/5,elbow (flexor,extensor) to 4/5, forearm(supinator,pronator) to 4/5, wrist(flexor,extensor) to 4/5 so that pt can put more wt on her arm when getting out of chair, perform light housework, & light lift/carry.   LTG 9 Current Status: To be retested   Long Term Goal 10 Increase rt UE strength:shoulder (flexion,abdcutor) to 3+/5, shoulder (IR,ER) to 4/5,elbow (flexor,extensor) to 4/5, forearm(supinator,pronator) to 4/5, wrist(flexor,extensor) to 4/5 so that pt can put more wt on her arm when getting out of chair, perform light housework, & light lift/carry.   LTG 10 Current Status: To be retested     TREATMENT PLAN   REQUIRES OT FOLLOW-UP: Yes  Type: Outpatient  Plan  Plan Frequency: 2x/week  Plan Weeks: 20 visits +  20 visits after Dec 19, 2023 + recommend continue OT 20 more

## 2024-07-26 ENCOUNTER — APPOINTMENT (OUTPATIENT)
Dept: OCCUPATIONAL THERAPY | Age: 24
End: 2024-07-26
Payer: MEDICAID

## 2024-07-29 ENCOUNTER — APPOINTMENT (OUTPATIENT)
Dept: OCCUPATIONAL THERAPY | Age: 24
End: 2024-07-29
Payer: MEDICAID

## 2024-07-31 ENCOUNTER — APPOINTMENT (OUTPATIENT)
Dept: OCCUPATIONAL THERAPY | Age: 24
End: 2024-07-31
Payer: MEDICAID

## 2024-08-06 ENCOUNTER — HOSPITAL ENCOUNTER (OUTPATIENT)
Dept: OCCUPATIONAL THERAPY | Age: 24
Setting detail: THERAPIES SERIES
Discharge: HOME | End: 2024-08-06
Payer: MEDICAID

## 2024-08-06 PROCEDURE — 97140 MANUAL THERAPY 1/> REGIONS: CPT

## 2024-08-06 PROCEDURE — 97110 THERAPEUTIC EXERCISES: CPT

## 2024-08-06 ASSESSMENT — PAIN DESCRIPTION - PAIN TYPE: TYPE: ACUTE PAIN

## 2024-08-06 ASSESSMENT — PAIN DESCRIPTION - ORIENTATION: ORIENTATION: LEFT;RIGHT

## 2024-08-06 ASSESSMENT — PAIN SCALES - GENERAL: PAINLEVEL_OUTOF10: 6

## 2024-08-06 NOTE — FLOWSHEET NOTE
12:41 PM       Treatment Charges: Mins Units Time In/Out Units Billed/Units Approved- Expires 9/24/24   [] Evaluation       []  Low       []  Moderate       []  High       []  Electrical Stim       []  Iontophoresis       []  Paraffin       []  Ultrasound         []  Masage       []  Neuromuscular Re-ed       []  ADL       [x]  Ther Exercise 36 2 11:33-11:48,12:03-12:24  19/54   []  Ther Activities    2/36   []  Neuro Re-Ed       []  Splinting       [x]  manual therapy 15 1 11:48-12:03 9/18   Total Treatment time 51 min 3           POC NOTE

## 2024-08-08 ENCOUNTER — HOSPITAL ENCOUNTER (OUTPATIENT)
Dept: OCCUPATIONAL THERAPY | Age: 24
Setting detail: THERAPIES SERIES
Discharge: HOME | End: 2024-08-08
Payer: MEDICAID

## 2024-08-08 PROCEDURE — 97110 THERAPEUTIC EXERCISES: CPT

## 2024-08-08 PROCEDURE — 97140 MANUAL THERAPY 1/> REGIONS: CPT

## 2024-08-08 ASSESSMENT — PAIN DESCRIPTION - PAIN TYPE: TYPE: ACUTE PAIN

## 2024-08-08 ASSESSMENT — PAIN DESCRIPTION - ORIENTATION: ORIENTATION: LEFT;RIGHT

## 2024-08-08 ASSESSMENT — PAIN SCALES - GENERAL: PAINLEVEL_OUTOF10: 7

## 2024-08-08 NOTE — FLOWSHEET NOTE
TREATMENT PLAN   REQUIRES OT FOLLOW-UP: Yes  Type: Outpatient  Plan  Plan Frequency: 2x/week  Plan Weeks: 20 visits +  20 visits after Dec 19, 2023 + recommend continue OT 20 more visits after Feb 5, 2024. Recommend 20 OT visits after April 22, 2024 .(OT Approved t from 4/23/24-6/23/2024 therapeutic exercises(59662) 60 units,manual therapy (86895) 40 units,therapeutic exercises, therapeutic activity (36017)  40 units). Recommend 10  OT visits after 7/23/2024  Current Treatment Recommendations: Strengthening, ROM, Pain management, Coordination training, Patient/Caregiver education & training  Additional Comments: continue OT 10 more visits       Therapy Time  Individual Time In: 0914  Individual Time Out: 1002  Minutes: 48  Timed Code Treatment Minutes: 48 Minutes     Treatment charges  Mins Units Time In/Out Units Billed/Units Approved- Expires 9/24/24   [] Evaluation       []  Low       []  Moderate       []  High           []  Electrical Stim           []  Iontophoresis           []  Paraffin           []  Ultrasound           []  Masage           []  Neuromuscular Re-ed           []  ADL           [x]  Ther Exercise 33 2 09:14-09:20,09:35-10:02  21/54   []  Ther Activities       2/36   []  Neuro Re-Ed           []  Splinting           [x]  manual therapy 15 1 09:20-09:35 10/18   Total Treatment time 48min 3           Electronically signed by Loreto Moore OT  on 8/8/2024 at 11:57 AM             POC NOTE

## 2024-08-12 ENCOUNTER — HOSPITAL ENCOUNTER (OUTPATIENT)
Dept: OCCUPATIONAL THERAPY | Age: 24
Setting detail: THERAPIES SERIES
Discharge: HOME | End: 2024-08-12
Payer: MEDICAID

## 2024-08-12 PROCEDURE — 97140 MANUAL THERAPY 1/> REGIONS: CPT

## 2024-08-12 PROCEDURE — 97110 THERAPEUTIC EXERCISES: CPT

## 2024-08-12 PROCEDURE — 97530 THERAPEUTIC ACTIVITIES: CPT

## 2024-08-12 ASSESSMENT — PAIN DESCRIPTION - ORIENTATION: ORIENTATION: RIGHT;LEFT

## 2024-08-12 ASSESSMENT — PAIN DESCRIPTION - PAIN TYPE: TYPE: ACUTE PAIN

## 2024-08-12 ASSESSMENT — PAIN SCALES - GENERAL: PAINLEVEL_OUTOF10: 7

## 2024-08-12 NOTE — FLOWSHEET NOTE
[facility]  Occupational Therapy  [address]    [place], [place] [address]  Phone: [phone]  Fax: [phone]     Occupational Therapy Daily Treatment note     Occupational Therapy: Daily Note   Patient: Veronica Marie Fritsch (24 y.o. female)   Examination Date: 2024  No data recorded  No data recorded   :  2000 # of Visits since SOC:   75   MRN: [id number]  CSN: [id number] Start of Care Date: 2023   Insurance: Payor: [employer] [place] / Plan: [place] / Product Type: *No Product type* /   Insurance ID: [id number] - (Medicaid Managed) Secondary Insurance (if applicable):    Insurance Information: [employer]  (30 visits OT then pt will need authorization for futher visits). Approved visits from  (therapeutic exercises(52190) 60 units,manual therapy (70027) 40 units,therapeutic exercises, therapeutic activity (19934) 40 units). Pt approved from  to 2024 (therapeutic exercise 14933 - 54 units, manual therapy 01736 -18 units, therapeutic activity 96530-45 units)  with some visits used so starting with tx 2024 pt has 10 units 76091, 37 units 93410, 34 units 41378   Referring Physician: Marybel Nava MD Dr Ashley Schneider at [facility] per pt request.   PCP: Valentín Prasad PA Visits to Date/Visits Approved: 72 (52 visits in ) / 80    No Show/Cancelled Appts:   /       Medical Diagnosis: Pain in both wrists [M25.531, M25.532]  Complex regional pain syndrome type 1, affecting unspecified site [G90.50] pain in both wrists (M25.531, M25.532) ICD-10 code, complex regional pain syndrome type 1 affection unspecified site (G90.50) ICD-10 code  Treatment Diagnosis: bilateral UE & hand weakness,impaired coordination, impaired sensation, pain, stiffness( bilateral shoulders,elbow flexion,supination,wrist UD) & rt wrist extension (ICD-10 codes: M62.81, R27.9, R20.2, M79.621,

## 2024-08-16 ENCOUNTER — HOSPITAL ENCOUNTER (OUTPATIENT)
Dept: OCCUPATIONAL THERAPY | Age: 24
Setting detail: THERAPIES SERIES
Discharge: HOME | End: 2024-08-16
Payer: MEDICAID

## 2024-08-16 PROCEDURE — 97140 MANUAL THERAPY 1/> REGIONS: CPT

## 2024-08-16 PROCEDURE — 97530 THERAPEUTIC ACTIVITIES: CPT

## 2024-08-16 PROCEDURE — 97110 THERAPEUTIC EXERCISES: CPT

## 2024-08-16 ASSESSMENT — PAIN DESCRIPTION - ORIENTATION: ORIENTATION: RIGHT;LEFT

## 2024-08-16 ASSESSMENT — PAIN SCALES - GENERAL: PAINLEVEL_OUTOF10: 6

## 2024-08-16 ASSESSMENT — PAIN DESCRIPTION - PAIN TYPE: TYPE: ACUTE PAIN

## 2024-08-16 NOTE — FLOWSHEET NOTE
[facility] Outpatient Rehabilitation and Therapy  Occupational Therapy  [address]    [place], [place] [address]  Phone: [phone]  Fax: [phone]     Occupational Therapy Daily Treatment note     Occupational Therapy: Daily Note   Patient: Veronica Marie Fritsch (24 y.o. female)   Examination Date: 2024  No data recorded  No data recorded   :  2000 # of Visits since SOC:   76   MRN: [id number]  CSN: [id number] Start of Care Date: 2023   Insurance: Pay[place]: [employer] / Plan: [employer] / Product Type: *No Product type* /   Insurance ID: [id number] - (Medicaid Managed) Secondary Insurance (if applicable):    Insurance Information: [employer]  (30 visits OT then pt will need authorization for futher visits). Approved visits from  (therapeutic exercises(74198) 60 units,manual therapy (14743) 40 units,therapeutic exercises, therapeutic activity (17325) 40 units). Pt approved from  to 2024 (therapeutic exercise 80377 - 54 units, manual therapy 55379 -18 units, therapeutic activity 52996-21 units)  with some visits used so starting with tx 2024 pt has 10 units 51336, 37 units 28177, 34 units 06748   Referring Physician: Marybel Nava MD Dr Ashley Schneider at [facility] per pt request.   PCP: Valentín Prasad PA Visits to Date/Visits Approved: 73 (53 in ) / 80    No Show/Cancelled Appts:   /       Medical Diagnosis: Pain in both wrists [M25.531, M25.532]  Complex regional pain syndrome type 1, affecting unspecified site [G90.50] pain in both wrists (M25.531, M25.532) ICD-10 code, complex regional pain syndrome type 1 affection unspecified site (G90.50) ICD-10 code  Treatment Diagnosis: bilateral UE & hand weakness,impaired coordination, impaired sensation, pain, stiffness( bilateral shoulders,elbow flexion,supination,wrist UD) & rt wrist extension (ICD-10 codes: M62.81, R27.9, R20.2, M79.561,

## 2024-08-21 ENCOUNTER — HOSPITAL ENCOUNTER (OUTPATIENT)
Dept: OCCUPATIONAL THERAPY | Age: 24
Setting detail: THERAPIES SERIES
Discharge: HOME | End: 2024-08-21
Payer: MEDICAID

## 2024-08-21 PROCEDURE — 97140 MANUAL THERAPY 1/> REGIONS: CPT

## 2024-08-21 PROCEDURE — 97110 THERAPEUTIC EXERCISES: CPT

## 2024-08-21 ASSESSMENT — PAIN DESCRIPTION - PAIN TYPE: TYPE: ACUTE PAIN

## 2024-08-21 ASSESSMENT — PAIN DESCRIPTION - ORIENTATION: ORIENTATION: LEFT;RIGHT

## 2024-08-21 ASSESSMENT — PAIN SCALES - GENERAL: PAINLEVEL_OUTOF10: 5

## 2024-08-21 NOTE — FLOWSHEET NOTE
[]  Ultrasound           []  Masage           []  Neuromuscular Re-ed           []  ADL           [x]  Ther Exercise 28 2 10:47-11:01,11:21-11:35  26/54   []  Ther Activities       4/36   []  Neuro Re-Ed           []  Splinting           [x]  manual therapy 20 1 11:01-11:21 13/18   Total Treatment time 48 min 3            Electronically signed by Loreto Moore OT on 8/21/2024 at 12:07 PM    POC NOTE

## 2024-08-23 ENCOUNTER — HOSPITAL ENCOUNTER (OUTPATIENT)
Dept: OCCUPATIONAL THERAPY | Age: 24
Setting detail: THERAPIES SERIES
Discharge: HOME | End: 2024-08-23
Payer: MEDICAID

## 2024-08-23 PROCEDURE — 97110 THERAPEUTIC EXERCISES: CPT

## 2024-08-23 PROCEDURE — 97140 MANUAL THERAPY 1/> REGIONS: CPT

## 2024-08-23 PROCEDURE — 97530 THERAPEUTIC ACTIVITIES: CPT

## 2024-08-23 ASSESSMENT — PAIN DESCRIPTION - ORIENTATION: ORIENTATION: RIGHT;LEFT

## 2024-08-23 ASSESSMENT — PAIN SCALES - GENERAL: PAINLEVEL_OUTOF10: 5

## 2024-08-23 ASSESSMENT — PAIN DESCRIPTION - PAIN TYPE: TYPE: ACUTE PAIN

## 2024-08-23 NOTE — FLOWSHEET NOTE
30 points & report doing ADLs & light IADLs are getting easier to do.       2. Pt will report decrease in bilateral UE pain to 3 when she completes her daily activities and exercises.       3. Compared to eval :Decrease muscle tightness & Increase PROM bilateral UE shoulder (flexion, abduction) by 20       4. Compared to eval : Increase AROM by 20 rt shoulder flexion & abduction, & by 15 rt elbow to improve functional motion for getting dressed & reaching into cabinets.       5. Compared to eval: Increase AROM by  20 lt shoulder flexion & abduction to improve functional motion for getting dressed & reaching into cabinets.         6. Compared to eval: Increase rt hand strength:  by 15-20# & pinch (lateral , bauer, tip) by 4-5 # so that pt will be able to open jars/bottles easier.                7. Compared to eval: Increase lt hand strength:  by 15-20# & pinch (lateral, bauer, tip) by 4-5# so that pt will be able to open jars/bottles easier.                8. Compared to eval:Pt will report improved bilateral hand coordination for fine motor ADLs (button manipulation,cutting food). Pt will complete 9 hole peg test 20 seconds quicker than eval with rt hand/lt hand.                9. Increase lt UE strength: shoulder (flexion,abdcutor) to 3+/5, shoulder (IR,ER) to 4/5,elbow (flexor,extensor) to 4/5, forearm(supinator,pronator) to 4/5, wrist(flexor,extensor) to 4/5 so that pt can put more wt on her arm when getting out of chair, perform light housework, & light lift/carry.               10. Increase rt UE strength:shoulder (flexion,abdcutor) to 3+/5, shoulder (IR,ER) to 4/5,elbow (flexor,extensor) to 4/5, forearm(supinator,pronator) to 4/5, wrist(flexor,extensor) to 4/5 so that pt can put more wt on her arm when getting out of chair, perform light housework, & light lift/carry.                   TREATMENT PLAN   REQUIRES OT FOLLOW-UP: Yes  Type: Outpatient  Plan  Plan Frequency: 2x/week  Plan Weeks: 20 visits

## 2024-08-28 ENCOUNTER — HOSPITAL ENCOUNTER (OUTPATIENT)
Dept: OCCUPATIONAL THERAPY | Age: 24
Setting detail: THERAPIES SERIES
Discharge: HOME | End: 2024-08-28
Payer: MEDICAID

## 2024-08-28 PROCEDURE — 97140 MANUAL THERAPY 1/> REGIONS: CPT

## 2024-08-28 PROCEDURE — 97110 THERAPEUTIC EXERCISES: CPT

## 2024-08-28 ASSESSMENT — PAIN SCALES - GENERAL: PAINLEVEL_OUTOF10: 7

## 2024-08-28 ASSESSMENT — PAIN DESCRIPTION - PAIN TYPE: TYPE: ACUTE PAIN

## 2024-08-28 ASSESSMENT — PAIN DESCRIPTION - ORIENTATION: ORIENTATION: RIGHT;LEFT

## 2024-08-28 NOTE — FLOWSHEET NOTE
[facility] Outpatient Rehabilitation and Therapy  Occupational Therapy  [address]    [place], [place] [address]  Phone: [phone]  Fax: [phone]     Occupational Therapy Daily Treatment note     Occupational Therapy: Daily Note   Patient: Veronica Marie Fritsch (24 y.o. female)   Examination Date: 2024  No data recorded  No data recorded   :  2000 # of Visits since SOC:   79   MRN: [id number]  CSN: [id number] Start of Care Date: 2023   Insurance: Pay[place]: [employer] / Plan: [place] / Product Type: *No Product type* /   Insurance ID: [id number] - (Medicaid Managed) Secondary Insurance (if applicable):    Insurance Information: [employer]  (30 visits OT then pt will need authorization for futher visits). Approved visits from  (therapeutic exercises(55512) 60 units,manual therapy (57289) 40 units,therapeutic exercises, therapeutic activity (04308) 40 units). Pt approved from  to 2024 (therapeutic exercise 94920 - 54 units, manual therapy 81498 -18 units, therapeutic activity 05889-56 units)  with some visits used so starting with tx 2024 -t has 10 units 12720, 37 units 86467, 34 units 72679   Referring Physician: Marybel Nava MD Dr Ashley Schneider at [facility] per pt request.   PCP: Valentín Prasad PA Visits to Date/Visits Approved: 76 (56 visits in ) / 80    No Show/Cancelled Appts:   /       Medical Diagnosis: Pain in both wrists [M25.531, M25.532]  Complex regional pain syndrome type 1, affecting unspecified site [G90.50] pain in both wrists (M25.531, M25.532) ICD-10 code, complex regional pain syndrome type 1 affection unspecified site (G90.50) ICD-10 code  Treatment Diagnosis: bilateral UE & hand weakness,impaired coordination, impaired sensation, pain, stiffness( bilateral shoulders,elbow flexion,supination,wrist UD) & rt wrist extension (ICD-10 codes: M62.81, R27.9,  4/5 so that pt can put more wt on her arm when getting out of chair, perform light housework, & light lift/carry.                   TREATMENT PLAN   REQUIRES OT FOLLOW-UP: Yes  Type: Outpatient  Plan  Plan Frequency: 2x/week  Plan Weeks: 20 visits +  20 visits after Dec 19, 2023 + recommend continue OT 20 more visits after Feb 5, 2024. Recommend 20 OT visits after April 22, 2024 .(OT Approved t from 4/23/24-6/23/2024 therapeutic exercises(59732) 60 units,manual therapy (55713) 40 units,therapeutic exercises, therapeutic activity (76120)  40 units). Recommend 10  OT visits after 7/23/2024 (Pt approved from June 24,2024 to Sept 24, 2024 (therapeutic exercise 26790 - 54 units, manual therapy 95798 -18 units, therapeutic activity 79813-76 units) with some visits used so starting with tx 8/6/2024 -9/24/2024pt has 10 units 09031, 37 units 00043, 34 units 13188)  Current Treatment Recommendations: Strengthening, ROM, Pain management, Coordination training, Patient/Caregiver education & training  Additional Comments: continue OT       Therapy Time  Individual Time In: 1121  Individual Time Out: 1209  Minutes: 48  Timed Code Treatment Minutes: 48 Minutes     Treatment charges  Mins Units Time In/Out Units Billed/Units Approved- Expires 9/24/24   [] Evaluation       []  Low       []  Moderate       []  High           []  Electrical Stim           []  Iontophoresis           []  Paraffin           []  Ultrasound           []  Masage           []  Neuromuscular Re-ed           []  ADL           [x]  Ther Exercise 28 2 11:21-11:36,11:56-12:09  29/54   []  Ther Activities       5/36   []  Neuro Re-Ed           []  Splinting           [x]  manual therapy 20 1 11:36-11:56 15/18   Total Treatment time 48min 3           Electronically signed by Loreto Moore OT on 8/28/2024 at 12:19 PM    POC NOTE

## 2024-09-03 ENCOUNTER — PATIENT MESSAGE (OUTPATIENT)
Dept: PRIMARY CARE CLINIC | Age: 24
End: 2024-09-03

## 2024-09-03 DIAGNOSIS — G90.513 COMPLEX REGIONAL PAIN SYNDROME AFFECTING BOTH UPPER ARMS: Primary | ICD-10-CM

## 2024-09-05 ENCOUNTER — HOSPITAL ENCOUNTER (OUTPATIENT)
Dept: OCCUPATIONAL THERAPY | Age: 24
Setting detail: THERAPIES SERIES
Discharge: HOME | End: 2024-09-05
Payer: MEDICAID

## 2024-09-05 DIAGNOSIS — G90.513 COMPLEX REGIONAL PAIN SYNDROME AFFECTING BOTH UPPER ARMS: ICD-10-CM

## 2024-09-05 LAB
BASOPHILS ABSOLUTE: 0.04 K/UL (ref 0–0.2)
BASOPHILS RELATIVE PERCENT: 1 % (ref 0–2)
EOSINOPHILS ABSOLUTE: 0.06 K/UL (ref 0–0.44)
EOSINOPHILS RELATIVE PERCENT: 1 % (ref 1–4)
HCT VFR BLD CALC: 40.8 % (ref 36.3–47.1)
HEMOGLOBIN: 13.6 G/DL (ref 11.9–15.1)
IMMATURE GRANULOCYTES %: 1 %
IMMATURE GRANULOCYTES ABSOLUTE: 0.03 K/UL (ref 0–0.3)
LYMPHOCYTES ABSOLUTE: 1.4 K/UL (ref 1.1–3.7)
LYMPHOCYTES RELATIVE PERCENT: 25 % (ref 24–43)
MCH RBC QN AUTO: 31.9 PG (ref 25.2–33.5)
MCHC RBC AUTO-ENTMCNC: 33.3 G/DL (ref 28.4–34.8)
MCV RBC AUTO: 95.6 FL (ref 82.6–102.9)
MONOCYTES ABSOLUTE: 0.51 K/UL (ref 0.1–1.2)
MONOCYTES RELATIVE PERCENT: 9 % (ref 3–12)
NEUTROPHILS ABSOLUTE: 3.57 K/UL (ref 1.5–8.1)
NEUTROPHILS RELATIVE PERCENT: 63 % (ref 36–65)
NRBC AUTOMATED: 0 PER 100 WBC
PDW BLD-RTO: 12 % (ref 11.8–14.4)
PLATELET # BLD: 236 K/UL (ref 138–453)
PMV BLD AUTO: 11.5 FL (ref 8.1–13.5)
RBC # BLD: 4.27 M/UL (ref 3.95–5.11)
WBC # BLD: 5.6 K/UL (ref 3.5–11.3)

## 2024-09-05 PROCEDURE — 97110 THERAPEUTIC EXERCISES: CPT

## 2024-09-05 PROCEDURE — 97530 THERAPEUTIC ACTIVITIES: CPT

## 2024-09-05 PROCEDURE — 97140 MANUAL THERAPY 1/> REGIONS: CPT

## 2024-09-05 ASSESSMENT — PAIN DESCRIPTION - ORIENTATION: ORIENTATION: RIGHT;LEFT

## 2024-09-05 ASSESSMENT — PAIN DESCRIPTION - PAIN TYPE: TYPE: ACUTE PAIN

## 2024-09-05 ASSESSMENT — PAIN SCALES - GENERAL: PAINLEVEL_OUTOF10: 7

## 2024-09-05 NOTE — FLOWSHEET NOTE
Pain management, Coordination training, Patient/Caregiver education & training       Therapy Time  Individual Time In: 1100  Individual Time Out: 1155  Minutes: 55  Timed Code Treatment Minutes: 55 Minutes     Treatment charges  Mins Units Time In/Out Units Billed/Units Approved- Expires 9/24/24   [] Evaluation       []  Low       []  Moderate       []  High           []  Electrical Stim           []  Iontophoresis           []  Paraffin           []  Ultrasound           []  Masage           []  Neuromuscular Re-ed           []  ADL           [x]  Ther Exercise 30 2 11:00-11:15, 11:40-11:55  31/54   [x]  Ther Activities  10 1  11:30-11:40 6/36   []  Neuro Re-Ed           []  Splinting           [x]  manual therapy 15 1 11:15-11:30 16/18   Total Treatment time 55min 3       Electronically signed by Loreto Moore OT on 9/5/2024 at 6:36 PM    POC NOTE

## 2024-09-12 ENCOUNTER — HOSPITAL ENCOUNTER (OUTPATIENT)
Dept: OCCUPATIONAL THERAPY | Age: 24
Setting detail: THERAPIES SERIES
Discharge: HOME | End: 2024-09-12
Payer: MEDICAID

## 2024-09-12 PROCEDURE — 97110 THERAPEUTIC EXERCISES: CPT

## 2024-09-19 ENCOUNTER — HOSPITAL ENCOUNTER (OUTPATIENT)
Dept: OCCUPATIONAL THERAPY | Age: 24
Setting detail: THERAPIES SERIES
Discharge: HOME | End: 2024-09-19
Payer: MEDICAID

## 2024-09-19 PROCEDURE — 97110 THERAPEUTIC EXERCISES: CPT

## 2024-09-19 PROCEDURE — 97140 MANUAL THERAPY 1/> REGIONS: CPT

## 2024-09-19 ASSESSMENT — PAIN DESCRIPTION - ORIENTATION: ORIENTATION: RIGHT;LEFT

## 2024-09-19 ASSESSMENT — PAIN SCALES - GENERAL: PAINLEVEL_OUTOF10: 6

## 2024-09-19 ASSESSMENT — PAIN DESCRIPTION - LOCATION: LOCATION: ARM;BACK;NECK;LEG

## 2024-09-19 ASSESSMENT — PAIN DESCRIPTION - PAIN TYPE: TYPE: ACUTE PAIN

## 2024-09-19 ASSESSMENT — 9 HOLE PEG TEST
TESTTIME_SECONDS: 19
TESTTIME_SECONDS: 19

## 2024-09-24 ENCOUNTER — APPOINTMENT (OUTPATIENT)
Dept: OCCUPATIONAL THERAPY | Age: 24
End: 2024-09-24
Payer: MEDICAID

## 2025-07-31 ENCOUNTER — OFFICE VISIT (OUTPATIENT)
Dept: PRIMARY CARE CLINIC | Age: 25
End: 2025-07-31
Payer: MEDICAID

## 2025-07-31 VITALS
BODY MASS INDEX: 27.42 KG/M2 | HEART RATE: 78 BPM | WEIGHT: 149 LBS | OXYGEN SATURATION: 96 % | HEIGHT: 62 IN | SYSTOLIC BLOOD PRESSURE: 118 MMHG | DIASTOLIC BLOOD PRESSURE: 80 MMHG

## 2025-07-31 DIAGNOSIS — D23.9 DERMATOFIBROMA: Primary | ICD-10-CM

## 2025-07-31 PROCEDURE — 99213 OFFICE O/P EST LOW 20 MIN: CPT | Performed by: STUDENT IN AN ORGANIZED HEALTH CARE EDUCATION/TRAINING PROGRAM

## 2025-07-31 RX ORDER — TRIAMCINOLONE ACETONIDE 5 MG/G
CREAM TOPICAL
Qty: 15 G | Refills: 1 | Status: SHIPPED | OUTPATIENT
Start: 2025-07-31 | End: 2025-08-07

## 2025-07-31 RX ORDER — TRIAMCINOLONE ACETONIDE 5 MG/G
CREAM TOPICAL
Qty: 15 G | Refills: 1 | Status: SHIPPED | OUTPATIENT
Start: 2025-07-31 | End: 2025-07-31

## 2025-07-31 SDOH — ECONOMIC STABILITY: FOOD INSECURITY: WITHIN THE PAST 12 MONTHS, YOU WORRIED THAT YOUR FOOD WOULD RUN OUT BEFORE YOU GOT MONEY TO BUY MORE.: NEVER TRUE

## 2025-07-31 SDOH — ECONOMIC STABILITY: FOOD INSECURITY: WITHIN THE PAST 12 MONTHS, THE FOOD YOU BOUGHT JUST DIDN'T LAST AND YOU DIDN'T HAVE MONEY TO GET MORE.: NEVER TRUE

## 2025-07-31 ASSESSMENT — ENCOUNTER SYMPTOMS
ABDOMINAL PAIN: 0
SHORTNESS OF BREATH: 0
NAUSEA: 1
VOMITING: 1

## 2025-07-31 ASSESSMENT — PATIENT HEALTH QUESTIONNAIRE - PHQ9
1. LITTLE INTEREST OR PLEASURE IN DOING THINGS: NOT AT ALL
SUM OF ALL RESPONSES TO PHQ QUESTIONS 1-9: 0
SUM OF ALL RESPONSES TO PHQ QUESTIONS 1-9: 0
2. FEELING DOWN, DEPRESSED OR HOPELESS: NOT AT ALL
DEPRESSION UNABLE TO ASSESS: FUNCTIONAL CAPACITY MOTIVATION LIMITS ACCURACY
SUM OF ALL RESPONSES TO PHQ QUESTIONS 1-9: 0
SUM OF ALL RESPONSES TO PHQ QUESTIONS 1-9: 0

## 2025-07-31 NOTE — PROGRESS NOTES
[facility]  [facility]  [address] [facility] [address]  Dept: [phone]    Veronica Marie Fritsch is a 25 y.o. female established patient, who presents acutely for her complaints as noted below:    Chief Complaint   Patient presents with    Wound Check     Patient states they the following concerns 3+ months ago cut self shaving arm pit, still not totally healed        HPI:     Right axillary wound - cut shaving, has been a problem on-and-off for 3 months or so.    Reviewed prior notes from PCP.  Reviewed previous labs.    No components found for: \"LDLCHOLESTEROL\", \"LDLCALC\"    (goal LDL is <100)   AST (U/L)   Date Value   12/14/2023 17     ALT (U/L)   Date Value   12/14/2023 16     BUN (mg/dL)   Date Value   12/14/2023 12     TSH (uIU/mL)   Date Value   12/14/2023 2.24     BP Readings from Last 3 Encounters:   07/31/25 118/80   09/08/23 138/78   10/15/17 121/68          (goal 120/80)    Past Medical History:   Diagnosis Date    Asthma     Headache     Sinusitis       Past Surgical History:   Procedure Laterality Date    SINUS SURGERY      TONSILLECTOMY         Family History   Problem Relation Age of Onset    High Blood Pressure Mother     Heart Disease Mother     Diabetes Mother     Cancer Mother     High Blood Pressure Father     Heart Disease Father     Breast Cancer Father     Asthma Father        Social History     Tobacco Use    Smoking status: Never    Smokeless tobacco: Never   Substance Use Topics    Alcohol use: Not on file      Current Outpatient Medications   Medication Sig Dispense Refill    EPINEPHrine 0.1 MG/ML injection Infuse intravenously      triamcinolone (ARISTOCORT) 0.5 % cream Apply topically to axilla 2 times daily for up to 14 days at a time 15 g 1    Alpha-Lipoic Acid 200 MG TABS Take 200 mg by mouth 2 times daily      REFRESH TEARS 0.5 % SOLN ophthalmic solution       Cholecalciferol (VITAMIN D-3 PO) Vitamin D3 2,000 unit tablet   1